# Patient Record
Sex: FEMALE | Race: BLACK OR AFRICAN AMERICAN | NOT HISPANIC OR LATINO | Employment: OTHER | URBAN - METROPOLITAN AREA
[De-identification: names, ages, dates, MRNs, and addresses within clinical notes are randomized per-mention and may not be internally consistent; named-entity substitution may affect disease eponyms.]

---

## 2017-01-16 ENCOUNTER — APPOINTMENT (OUTPATIENT)
Dept: PREADMISSION TESTING | Facility: HOSPITAL | Age: 67
End: 2017-01-16

## 2017-01-16 VITALS
HEIGHT: 66 IN | TEMPERATURE: 97 F | BODY MASS INDEX: 27.97 KG/M2 | OXYGEN SATURATION: 95 % | WEIGHT: 174 LBS | RESPIRATION RATE: 16 BRPM | DIASTOLIC BLOOD PRESSURE: 71 MMHG | SYSTOLIC BLOOD PRESSURE: 113 MMHG | HEART RATE: 84 BPM

## 2017-01-16 DIAGNOSIS — E21.3 HYPERPARATHYROIDISM (HCC): ICD-10-CM

## 2017-01-16 LAB
ANION GAP SERPL CALCULATED.3IONS-SCNC: 13.3 MMOL/L
BUN BLD-MCNC: 18 MG/DL (ref 8–23)
BUN/CREAT SERPL: 16.7 (ref 7–25)
CALCIUM SPEC-SCNC: 11 MG/DL (ref 8.6–10.5)
CALCIUM SPEC-SCNC: 11.1 MG/DL (ref 8.6–10.5)
CHLORIDE SERPL-SCNC: 104 MMOL/L (ref 98–107)
CO2 SERPL-SCNC: 26.7 MMOL/L (ref 22–29)
CREAT BLD-MCNC: 1.08 MG/DL (ref 0.57–1)
DEPRECATED RDW RBC AUTO: 46.5 FL (ref 37–54)
ERYTHROCYTE [DISTWIDTH] IN BLOOD BY AUTOMATED COUNT: 14.2 % (ref 11.7–13)
GFR SERPL CREATININE-BSD FRML MDRD: 51 ML/MIN/1.73
GLUCOSE BLD-MCNC: 94 MG/DL (ref 65–99)
HCT VFR BLD AUTO: 43.2 % (ref 35.6–45.5)
HGB BLD-MCNC: 13.3 G/DL (ref 11.9–15.5)
MCH RBC QN AUTO: 27.5 PG (ref 26.9–32)
MCHC RBC AUTO-ENTMCNC: 30.8 G/DL (ref 32.4–36.3)
MCV RBC AUTO: 89.3 FL (ref 80.5–98.2)
PLATELET # BLD AUTO: 414 10*3/MM3 (ref 140–500)
PMV BLD AUTO: 8.9 FL (ref 6–12)
POTASSIUM BLD-SCNC: 4.4 MMOL/L (ref 3.5–5.2)
PTH-INTACT SERPL-MCNC: 85.1 PG/ML (ref 15–65)
RBC # BLD AUTO: 4.84 10*6/MM3 (ref 3.9–5.2)
SODIUM BLD-SCNC: 144 MMOL/L (ref 136–145)
WBC NRBC COR # BLD: 6.95 10*3/MM3 (ref 4.5–10.7)

## 2017-01-16 PROCEDURE — 80048 BASIC METABOLIC PNL TOTAL CA: CPT | Performed by: NURSE PRACTITIONER

## 2017-01-16 PROCEDURE — 84100 ASSAY OF PHOSPHORUS: CPT | Performed by: NURSE PRACTITIONER

## 2017-01-16 PROCEDURE — 93005 ELECTROCARDIOGRAM TRACING: CPT

## 2017-01-16 PROCEDURE — 36415 COLL VENOUS BLD VENIPUNCTURE: CPT

## 2017-01-16 PROCEDURE — 85027 COMPLETE CBC AUTOMATED: CPT | Performed by: SURGERY

## 2017-01-16 PROCEDURE — 83970 ASSAY OF PARATHORMONE: CPT | Performed by: NURSE PRACTITIONER

## 2017-01-16 PROCEDURE — 93010 ELECTROCARDIOGRAM REPORT: CPT | Performed by: INTERNAL MEDICINE

## 2017-01-16 RX ORDER — ERGOCALCIFEROL 1.25 MG/1
50000 CAPSULE ORAL WEEKLY
COMMUNITY
End: 2017-02-10 | Stop reason: SDUPTHER

## 2017-01-16 NOTE — MR AVS SNAPSHOT
Lucretianguyễn Robbin   1/16/2017 12:00 PM   Appointment    Provider:  CAMERON CALABRESE 3   Department:  [facility] PREADMISSION T   Dept Phone:  [phone]                Your Full Care Plan           To Do List     2/3/2017 8:10 AM     Appointment with HILLARY COATES at [facility] ENDOCRINOLOGY ([phone])   [address]  [facility] [address]       2/10/2017 10:00 AM     Appointment with Gera Kitchen MD at [facility] ENDOCRINOLOGY ([phone])   Arrive 15 minutes prior to appointment.   [address]  [facility] [address]            Your Updated Medication List          This list is accurate as of: 1/16/17 12:37 PM.  Always use your most recent med list.                ACCU-CHEK SONIA PLUS W/DEVICE kit       ADVAIR DISKUS 500-50 MCG/DOSE DISKUS   Generic drug:  fluticasone-salmeterol       AMBIEN PO       amLODIPine 5 MG tablet   Commonly known as:  NORVASC       AUGMENTIN 500-125 MG per tablet   Generic drug:  amoxicillin-clavulanate       BENICAR 40 MG tablet   Generic drug:  olmesartan       cetirizine 10 MG tablet   Commonly known as:  zyrTEC       fexofenadine 180 MG tablet   Commonly known as:  ALLEGRA       fluticasone 50 MCG/ACT nasal spray   Commonly known as:  FLONASE       glipiZIDE 5 MG tablet   Commonly known as:  GLUCOTROL       INVOKANA 300 MG tablet   Generic drug:  Canagliflozin       metFORMIN 1000 MG tablet   Commonly known as:  GLUCOPHAGE       MUCINEX MAXIMUM STRENGTH 1200 MG tablet sustained-release 12 hour   Generic drug:  GuaiFENesin ER       pravastatin 80 MG tablet   Commonly known as:  PRAVACHOL       VENTOLIN  (90 BASE) MCG/ACT inhaler   Generic drug:  albuterol       vitamin D 12242 UNITS capsule capsule   Commonly known as:  ERGOCALCIFEROL               MyChart Signup     [facility] MyChart allows you to send messages to your doctor, view your test results,  "renew your prescriptions, schedule appointments, and more. To sign up, go to [email or web address] and click on the Sign Up Now link in the New User? box. Enter your [employer] Activation Code exactly as it appears below along with the last four digits of your Social Security Number and your Date of Birth () to complete the sign-up process. If you do not sign up before the expiration date, you must request a new code.    [employer] Activation Code: [email or web address]  Expires: 2017 12:37 PM    If you have questions, you can email [email or web address] or call [phone] to talk to our [employer] staff. Remember, Dem[employer] is NOT to be used for urgent needs. For medical emergencies, dial 911.               Other Info from Your Visit           Allergies     No Known Allergies      Vital Signs     Blood Pressure Pulse Temperature Respirations Height Weight    113/71 (BP Location: Right arm, Patient Position: Sitting) 84 97 °F (36.1 °C) (Oral) 16 66\" (167.6 cm) 174 lb (78.9 kg)    Oxygen Saturation Body Mass Index Smoking Status             95% 28.08 kg/m2 Former Smoker           Discharge Instructions       Take the following medications the morning of surgery with a small sip of water.  AMLODIPINE, BENICAR, ADVAIR    Arrive to hospital on your day of surgery at 8:00 AM    General Instructions:  • Do not eat or drink after midnight: includes water, mints, or gum. You may brush your teeth.  • Do not smoke, chew tobacco, or drink alcohol.  • Bring medications in original bottles, any inhalers and if applicable your C-PAP/ BI-PAP machine.  • Bring any papers given to you in the doctor’s office.  • Wear clean comfortable clothes and socks.  • Do not wear contact lenses or make-up.  Bring a case for your glasses if applicable.   • Bring crutches or walker if applicable.  • Leave all other valuables and jewelry at home.    If you were given a blood bank ID arm band remember to bring it with you the day of " surgery.    Preventing a Surgical Site Infection:  Shower on the morning of surgery using a fresh bar of anti-bacterial soap (such as Dial) and clean washcloth.  Dry with a clean towel and dress in clean clothing.  For 2 to 3 days before surgery, avoid shaving with a razor near where you will have surgery because the razor can irritate skin and make it easier to develop an infection  Ask your surgeon if you will be receiving antibiotics prior to surgery  Make sure you, your family, and all healthcare providers clean their hands with soap and water or an alcohol based hand  before caring for you or your wound  If at all possible, quit smoking as many days before surgery as you can.    Day of surgery:  Upon arrival, a Pre-op nurse and Anesthesiologist will review your health history, obtain vital signs, and answer questions you may have.  The only belongings needed at this time will be your home medications and if applicable your C-PAP/BI-PAP machine.  If you are staying overnight your family can leave the rest of your belongings in the car and bring them to your room later.  A Pre-op nurse will start an IV and you may receive medication in preparation for surgery, including something to help you relax.  Your family will be able to see you in the Pre-op area.  While you are in surgery your family should notify the waiting room  if they leave the waiting room area and provide a contact phone number.    Please be aware that surgery does come with discomfort.  We want to make every effort to control your discomfort so please discuss any uncontrolled symptoms with your nurse.   Your doctor will most likely have prescribed pain medications.      If you are going home after surgery you will receive individualized written care instructions before being discharged.  A responsible adult must drive you to and from the hospital on the day of your surgery and stay with you for 24 hours.    If you are staying  overnight following surgery, you will be transported to your hospital room following the recovery period.  [facility] has all private rooms.    If you have any questions please call Pre-Admission Testing at [phone].  Deductibles and co-payments are collected on the day of service. Please be prepared to pay the required co-pay, deductible or deposit on the day of service as defined by your plan.       SYMPTOMS OF A STROKE    Call 911 or have someone take you to the Emergency Department if you have any of the following:    · Sudden numbness or weakness of your face, arm or leg especially on one side of the body  · Sudden confusion, diffiiculty speaking or trouble understanding   · Changes in your vision or loss of sight in one eye  · Sudden severe headache with no known cause  · sudden dizziness, trouble walking, loss of balance or coordination    It is important to seek emergency care right away if you have further stroke symptoms. If you get emergency help quickly, the powerful clot-dissolving medicines can reduce the disabilities caused by a stroke.     For more information:    American Stroke Association  2-978-3-STROKE  www.strokeassociation.org           IF YOU SMOKE OR USE TOBACCO PLEASE READ THE FOLLOWING:    Why is smoking bad for me?  Smoking increases the risk of heart disease, lung disease, vascular disease, stroke, and cancer.     If you smoke, STOP!    If you would like more information on quitting smoking, please visit the [employer] website: [email or web address]   This link will provide additional resources including the QUIT line and the Beat the Pack support groups.     For more information:    American Cancer Society  ([phone] 819-9294    American Heart Association  1-979.383.8639

## 2017-01-16 NOTE — DISCHARGE INSTRUCTIONS
Take the following medications the morning of surgery with a small sip of water.  AMLODIPINE, BENICAR, ADVAIR    Arrive to hospital on your day of surgery at 8:00 AM    General Instructions:  • Do not eat or drink after midnight: includes water, mints, or gum. You may brush your teeth.  • Do not smoke, chew tobacco, or drink alcohol.  • Bring medications in original bottles, any inhalers and if applicable your C-PAP/ BI-PAP machine.  • Bring any papers given to you in the doctor’s office.  • Wear clean comfortable clothes and socks.  • Do not wear contact lenses or make-up.  Bring a case for your glasses if applicable.   • Bring crutches or walker if applicable.  • Leave all other valuables and jewelry at home.    If you were given a blood bank ID arm band remember to bring it with you the day of surgery.    Preventing a Surgical Site Infection:  Shower on the morning of surgery using a fresh bar of anti-bacterial soap (such as Dial) and clean washcloth.  Dry with a clean towel and dress in clean clothing.  For 2 to 3 days before surgery, avoid shaving with a razor near where you will have surgery because the razor can irritate skin and make it easier to develop an infection  Ask your surgeon if you will be receiving antibiotics prior to surgery  Make sure you, your family, and all healthcare providers clean their hands with soap and water or an alcohol based hand  before caring for you or your wound  If at all possible, quit smoking as many days before surgery as you can.    Day of surgery:  Upon arrival, a Pre-op nurse and Anesthesiologist will review your health history, obtain vital signs, and answer questions you may have.  The only belongings needed at this time will be your home medications and if applicable your C-PAP/BI-PAP machine.  If you are staying overnight your family can leave the rest of your belongings in the car and bring them to your room later.  A Pre-op nurse will start an IV and you  may receive medication in preparation for surgery, including something to help you relax.  Your family will be able to see you in the Pre-op area.  While you are in surgery your family should notify the waiting room  if they leave the waiting room area and provide a contact phone number.    Please be aware that surgery does come with discomfort.  We want to make every effort to control your discomfort so please discuss any uncontrolled symptoms with your nurse.   Your doctor will most likely have prescribed pain medications.      If you are going home after surgery you will receive individualized written care instructions before being discharged.  A responsible adult must drive you to and from the hospital on the day of your surgery and stay with you for 24 hours.    If you are staying overnight following surgery, you will be transported to your hospital room following the recovery period.  [facility] has all private rooms.    If you have any questions please call Pre-Admission Testing at [phone].  Deductibles and co-payments are collected on the day of service. Please be prepared to pay the required co-pay, deductible or deposit on the day of service as defined by your plan.

## 2017-01-19 ENCOUNTER — ANESTHESIA EVENT (OUTPATIENT)
Dept: PERIOP | Facility: HOSPITAL | Age: 67
End: 2017-01-19

## 2017-01-19 ENCOUNTER — ANESTHESIA (OUTPATIENT)
Dept: PERIOP | Facility: HOSPITAL | Age: 67
End: 2017-01-19

## 2017-01-19 PROCEDURE — 25010000003 CEFAZOLIN IN DEXTROSE 2-4 GM/100ML-% SOLUTION: Performed by: NURSE PRACTITIONER

## 2017-01-19 PROCEDURE — 25010000002 PROPOFOL 10 MG/ML EMULSION: Performed by: NURSE ANESTHETIST, CERTIFIED REGISTERED

## 2017-01-19 PROCEDURE — 25010000002 NEOSTIGMINE 10 MG/10ML SOLUTION: Performed by: NURSE ANESTHETIST, CERTIFIED REGISTERED

## 2017-01-19 PROCEDURE — 25010000002 ONDANSETRON PER 1 MG: Performed by: NURSE ANESTHETIST, CERTIFIED REGISTERED

## 2017-01-19 PROCEDURE — 25010000002 FENTANYL CITRATE (PF) 100 MCG/2ML SOLUTION: Performed by: ANESTHESIOLOGY

## 2017-01-19 RX ORDER — ONDANSETRON 2 MG/ML
INJECTION INTRAMUSCULAR; INTRAVENOUS AS NEEDED
Status: DISCONTINUED | OUTPATIENT
Start: 2017-01-19 | End: 2017-01-19 | Stop reason: SURG

## 2017-01-19 RX ORDER — PROPOFOL 10 MG/ML
VIAL (ML) INTRAVENOUS AS NEEDED
Status: DISCONTINUED | OUTPATIENT
Start: 2017-01-19 | End: 2017-01-19 | Stop reason: SURG

## 2017-01-19 RX ORDER — GLYCOPYRROLATE 0.2 MG/ML
INJECTION INTRAMUSCULAR; INTRAVENOUS AS NEEDED
Status: DISCONTINUED | OUTPATIENT
Start: 2017-01-19 | End: 2017-01-19 | Stop reason: SURG

## 2017-01-19 RX ORDER — LIDOCAINE HYDROCHLORIDE 20 MG/ML
INJECTION, SOLUTION INFILTRATION; PERINEURAL AS NEEDED
Status: DISCONTINUED | OUTPATIENT
Start: 2017-01-19 | End: 2017-01-19 | Stop reason: SURG

## 2017-01-19 RX ORDER — ROCURONIUM BROMIDE 10 MG/ML
INJECTION, SOLUTION INTRAVENOUS AS NEEDED
Status: DISCONTINUED | OUTPATIENT
Start: 2017-01-19 | End: 2017-01-19 | Stop reason: SURG

## 2017-01-19 RX ORDER — NEOSTIGMINE METHYLSULFATE 1 MG/ML
INJECTION, SOLUTION INTRAVENOUS AS NEEDED
Status: DISCONTINUED | OUTPATIENT
Start: 2017-01-19 | End: 2017-01-19 | Stop reason: SURG

## 2017-01-19 RX ORDER — LABETALOL HYDROCHLORIDE 5 MG/ML
INJECTION, SOLUTION INTRAVENOUS AS NEEDED
Status: DISCONTINUED | OUTPATIENT
Start: 2017-01-19 | End: 2017-01-19 | Stop reason: SURG

## 2017-01-19 RX ADMIN — PROPOFOL 160 MG: 10 INJECTION, EMULSION INTRAVENOUS at 10:10

## 2017-01-19 RX ADMIN — CEFAZOLIN SODIUM 2 G: 2 INJECTION, SOLUTION INTRAVENOUS at 10:08

## 2017-01-19 RX ADMIN — LABETALOL HYDROCHLORIDE 5 MG: 5 INJECTION, SOLUTION INTRAVENOUS at 10:17

## 2017-01-19 RX ADMIN — LABETALOL HYDROCHLORIDE 5 MG: 5 INJECTION, SOLUTION INTRAVENOUS at 10:23

## 2017-01-19 RX ADMIN — FENTANYL CITRATE 100 MCG: 50 INJECTION INTRAMUSCULAR; INTRAVENOUS at 10:08

## 2017-01-19 RX ADMIN — NEOSTIGMINE METHYLSULFATE 3 MG: 1 INJECTION INTRAVENOUS at 11:11

## 2017-01-19 RX ADMIN — GLYCOPYRROLATE 0.6 MG: 0.2 INJECTION INTRAMUSCULAR; INTRAVENOUS at 11:11

## 2017-01-19 RX ADMIN — LIDOCAINE HYDROCHLORIDE 60 MG: 20 INJECTION, SOLUTION INFILTRATION; PERINEURAL at 10:10

## 2017-01-19 RX ADMIN — ONDANSETRON 4 MG: 2 INJECTION INTRAMUSCULAR; INTRAVENOUS at 11:02

## 2017-01-19 RX ADMIN — ROCURONIUM BROMIDE 35 MG: 10 INJECTION INTRAVENOUS at 10:10

## 2017-01-19 NOTE — ANESTHESIA PREPROCEDURE EVALUATION
Anesthesia Evaluation     Patient summary reviewed and Nursing notes reviewed    History of anesthetic complications   Airway   Mallampati: II  TM distance: >3 FB  Neck ROM: full  no difficulty expected  Dental - normal exam     Pulmonary - normal exam   (+) asthma,   Cardiovascular - normal exam  (+) hypertension, valvular problems/murmurs,     ROS comment: No sx    Neuro/Psych- negative ROS  GI/Hepatic/Renal/Endo    (+)  diabetes mellitus,     Musculoskeletal (-) negative ROS    Abdominal  - normal exam   Substance History - negative use     OB/GYN negative ob/gyn ROS         Other                             Anesthesia Plan    ASA 3     general   (Ponv)  intravenous induction   Anesthetic plan and risks discussed with patient.    Plan discussed with CRNA.

## 2017-01-19 NOTE — ANESTHESIA PROCEDURE NOTES
Airway  Urgency: elective    Date/Time: 1/19/2017 10:12 AM  Airway not difficult    General Information and Staff    Patient location during procedure: OR  Anesthesiologist: RASHAD GUZMÁN  CRNA: JOCELYN BURNETT    Indications and Patient Condition  Indications for airway management: airway protection    Preoxygenated: yes  Mask difficulty assessment: 1 - vent by mask    Final Airway Details  Final airway type: endotracheal airway      Successful airway: ETT  Cuffed: yes   Successful intubation technique: direct laryngoscopy  Endotracheal tube insertion site: oral  Blade: Jodi  Blade size: #3  ETT size: 7.0 mm  Cormack-Lehane Classification: grade III - view of epiglottis only  Placement verified by: chest auscultation and capnometry   Cuff volume (mL): 4  Measured from: lips  ETT to lips (cm): 20  Number of attempts at approach: 1    Additional Comments  Smooth IV induction. Trachea intubated. Cuff up. Ett secured. BEBS. Dentition intact without injury.

## 2017-01-19 NOTE — ANESTHESIA POSTPROCEDURE EVALUATION
Patient: Roger Siegel    Procedure Summary     Date Anesthesia Start Anesthesia Stop Room / Location    01/19/17 1004 1125  [facility]  /  [facility]       Procedure Diagnosis Surgeon Provider    RIGHT SUPERIOR PARATHYROIDECTOMY (Right Neck) Parathyroid adenoma  (Parathyroid adenoma [D35.1]) MD Reji Alvarado MD          Anesthesia Type: general  Last vitals  /70 (01/19/17 1223)    Temp 36.7 °C (98 °F) (01/19/17 1215)    Pulse 64 (01/19/17 1223)   Resp 16 (01/19/17 1223)    SpO2 93 % (01/19/17 1223)      Post Anesthesia Care and Evaluation    Patient location during evaluation: bedside  Patient participation: complete - patient participated  Level of consciousness: awake  Pain score: 1  Pain management: adequate  Airway patency: patent  Anesthetic complications: No anesthetic complications    Cardiovascular status: acceptable  Respiratory status: acceptable  Hydration status: acceptable

## 2017-01-23 ENCOUNTER — TELEPHONE (OUTPATIENT)
Dept: SURGERY | Facility: CLINIC | Age: 67
End: 2017-01-23

## 2017-01-23 ENCOUNTER — LAB (OUTPATIENT)
Dept: LAB | Facility: HOSPITAL | Age: 67
End: 2017-01-23

## 2017-01-23 DIAGNOSIS — E83.52 HYPERCALCEMIA: ICD-10-CM

## 2017-01-23 DIAGNOSIS — D35.1 PARATHYROID ADENOMA: ICD-10-CM

## 2017-01-23 LAB — CALCIUM SPEC-SCNC: 10.5 MG/DL (ref 8.6–10.5)

## 2017-01-23 PROCEDURE — 36415 COLL VENOUS BLD VENIPUNCTURE: CPT

## 2017-01-23 PROCEDURE — 82310 ASSAY OF CALCIUM: CPT

## 2017-01-23 NOTE — TELEPHONE ENCOUNTER
Telephone note    Phoned Mrs. Siegel with her calcium result of 10.5, and noted that she needed to change her calcium supplementation from 4 tablets daily, to 2 tablets daily now.  Next week she should stop the calcium.    She'll follow-up with Dr. Kitchen next week and he can check her calcium level at that time if desired and make recommendations regarding her vitamin D supplementation which she began.    Maria C Silverio MD

## 2017-01-27 ENCOUNTER — OFFICE VISIT (OUTPATIENT)
Dept: SURGERY | Facility: CLINIC | Age: 67
End: 2017-01-27

## 2017-01-27 DIAGNOSIS — Z98.890 POST-OPERATIVE STATE: Primary | ICD-10-CM

## 2017-01-27 PROCEDURE — 99024 POSTOP FOLLOW-UP VISIT: CPT | Performed by: SURGERY

## 2017-01-27 RX ORDER — ZOLPIDEM TARTRATE 10 MG/1
10 TABLET ORAL NIGHTLY PRN
COMMUNITY
Start: 2017-01-09 | End: 2018-02-14 | Stop reason: SDDI

## 2017-01-27 NOTE — PROGRESS NOTES
CC: Post-op Visit Jean Claude    66 y.o. female who returns to the office for a postoperative visit after right superior parathyroid resection on 1/19/17    Preoperative indications were hypercalcium    Intraoperative findings were an intrathyroidal parathyroid adenoma    Pathology showed a 547 mg parathyroid, 1.1 cm hypercellular    Today, she looks great.  She has some swelling as expected, that will resolve.  Her calcium was 10.5 earlier on 4 tabs.  i suggested she cut back to 2.  She will stop next week and have labs later in the week at Dr Kitchen.  He will make recommendations on vit D.    i'll see her prn.    Maria C Silverio MD

## 2017-01-27 NOTE — MR AVS SNAPSHOT
Roger Siegel   1/27/2017 2:30 PM   Office Visit    Dept Phone:  [phone]   Encounter #:  [id number]    Provider:  Maria C Silverio MD   Department:  [facility] GENERAL SURGERY                Your Full Care Plan              Today's Medication Changes          These changes are accurate as of: 1/27/17  3:03 PM.  If you have any questions, ask your nurse or doctor.               Medication(s)that have changed:     zolpidem 10 MG tablet   Commonly known as:  AMBIEN   Take 10 mg by mouth At Night As Needed.   What changed:  Another medication with the same name was removed. Continue taking this medication, and follow the directions you see here.         Stop taking medication(s)listed here:     oxyCODONE-acetaminophen 5-325 MG per tablet   Commonly known as:  ROXICET           promethazine 25 MG tablet   Commonly known as:  PHENERGAN                      Your Updated Medication List          This list is accurate as of: 1/27/17  3:03 PM.  Always use your most recent med list.                ACCU-CHEK SONIA PLUS W/DEVICE kit       ADVAIR DISKUS 500-50 MCG/DOSE DISKUS   Generic drug:  fluticasone-salmeterol       amLODIPine 5 MG tablet   Commonly known as:  NORVASC       BENICAR 40 MG tablet   Generic drug:  olmesartan       calcium-vitamin D 500-200 MG-UNIT per tablet   Commonly known as:  OSCAL 500/200 D-3   Take 2 tablets by mouth 2 (Two) Times a Day for 30 days.       cetirizine 10 MG tablet   Commonly known as:  zyrTEC       fexofenadine 180 MG tablet   Commonly known as:  ALLEGRA       fluticasone 50 MCG/ACT nasal spray   Commonly known as:  FLONASE       glipiZIDE 5 MG tablet   Commonly known as:  GLUCOTROL       INVOKANA 300 MG tablet   Generic drug:  Canagliflozin       metFORMIN 1000 MG tablet   Commonly known as:  GLUCOPHAGE       MUCINEX MAXIMUM STRENGTH 1200 MG tablet sustained-release 12 hour   Generic drug:  GuaiFENesin ER       pravastatin 80 MG tablet      Commonly known as:  PRAVACHOL       VENTOLIN  (90 BASE) MCG/ACT inhaler   Generic drug:  albuterol       vitamin D 39812 UNITS capsule capsule   Commonly known as:  ERGOCALCIFEROL       zolpidem 10 MG tablet   Commonly known as:  AMBIEN               You Were Diagnosed With        Codes Comments    Post-operative state    -  Primary ICD-10-CM: Z98.890  ICD-9-CM: V45.89       Instructions     None    Patient Instructions History      Upcoming Appointments     Visit Type Date Time Department    POST-OP 2017  2:30 PM [facility]    [facility] 2/3/2017  8:10 AM [facility]    OFFICE VISIT 2/10/2017 10:00 AM [facility]      [employer] Signup     [employer] allows you to send messages to your doctor, view your test results, renew your prescriptions, schedule appointments, and more. To sign up, go to [employer] and click on the Sign Up Now link in the New User? box. Enter your [employer] Activation Code exactly as it appears below along with the last four digits of your Social Security Number and your Date of Birth () to complete the sign-up process. If you do not sign up before the expiration date, you must request a new code.    [employer] Activation Code: [email or web address]  Expires: 2017 12:37 PM    If you have questions, you can email [email or web address] or call [phone] to talk to our [employer] staff. Remember, [employer] is NOT to be used for urgent needs. For medical emergencies, dial 911.               Other Info from Your Visit           Your Appointments     2017  8:10 AM EST   [facility] with [facility], [facility]   [facility] ENDOCRINOLOGY (--)    [address] [place] [id number] [phone]            Feb 10, 2017 10:00 AM EST   Office Visit with Gera Kitchen MD   [facility] ENDOCRINOLOGY (--)    [address] [place] [id number] [phone]            Arrive 15 minutes prior to appointment.              Allergies     No Known Allergies      Reason for Visit     Post-op ·Parathyroid adenoma resection, right superior on 01/19/2017      Vital Signs     Smoking Status                   Former Smoker           Problems and Diagnoses Noted     History of parathyroid surgery    Postoperative state    -  Primary      No Longer an Issue     Serum calcium elevated

## 2017-01-31 DIAGNOSIS — E11.9 CONTROLLED TYPE 2 DIABETES MELLITUS WITHOUT COMPLICATION, UNSPECIFIED LONG TERM INSULIN USE STATUS: ICD-10-CM

## 2017-01-31 DIAGNOSIS — E04.2 NONTOXIC MULTINODULAR GOITER: ICD-10-CM

## 2017-01-31 DIAGNOSIS — E78.49 OTHER HYPERLIPIDEMIA: Primary | ICD-10-CM

## 2017-01-31 DIAGNOSIS — E55.9 VITAMIN D DEFICIENCY: ICD-10-CM

## 2017-02-03 ENCOUNTER — LAB (OUTPATIENT)
Dept: ENDOCRINOLOGY | Age: 67
End: 2017-02-03

## 2017-02-03 DIAGNOSIS — E78.49 OTHER HYPERLIPIDEMIA: ICD-10-CM

## 2017-02-03 DIAGNOSIS — E11.9 CONTROLLED TYPE 2 DIABETES MELLITUS WITHOUT COMPLICATION, UNSPECIFIED LONG TERM INSULIN USE STATUS: ICD-10-CM

## 2017-02-03 DIAGNOSIS — E55.9 VITAMIN D DEFICIENCY: ICD-10-CM

## 2017-02-03 DIAGNOSIS — E04.2 NONTOXIC MULTINODULAR GOITER: ICD-10-CM

## 2017-02-04 LAB — 25(OH)D3+25(OH)D2 SERPL-MCNC: 49.6 NG/ML (ref 30–100)

## 2017-02-07 LAB
ALBUMIN SERPL-MCNC: 4.5 G/DL (ref 3.5–5.2)
ALBUMIN/GLOB SERPL: 1.4 G/DL
ALP SERPL-CCNC: 67 U/L (ref 39–117)
ALT SERPL-CCNC: 7 U/L (ref 1–33)
AST SERPL-CCNC: 11 U/L (ref 1–32)
BILIRUB SERPL-MCNC: 0.4 MG/DL (ref 0.1–1.2)
BUN SERPL-MCNC: 20 MG/DL (ref 8–23)
BUN/CREAT SERPL: 21.1 (ref 7–25)
C PEPTIDE SERPL-MCNC: 2.6 NG/ML (ref 1.1–4.4)
CALCIUM SERPL-MCNC: 10.5 MG/DL (ref 8.6–10.5)
CHLORIDE SERPL-SCNC: 99 MMOL/L (ref 98–107)
CHOLEST SERPL-MCNC: 197 MG/DL (ref 0–200)
CO2 SERPL-SCNC: 25 MMOL/L (ref 22–29)
CREAT SERPL-MCNC: 0.95 MG/DL (ref 0.57–1)
GLOBULIN SER CALC-MCNC: 3.3 GM/DL
GLUCOSE SERPL-MCNC: 107 MG/DL (ref 65–99)
HBA1C MFR BLD: 6.4 % (ref 4.8–5.6)
HDLC SERPL-MCNC: 36 MG/DL (ref 40–60)
LDLC SERPL CALC-MCNC: 133 MG/DL (ref 0–100)
MICROALBUMIN UR-MCNC: 6.6 UG/ML
POTASSIUM SERPL-SCNC: 4.3 MMOL/L (ref 3.5–5.2)
PROT SERPL-MCNC: 7.8 G/DL (ref 6–8.5)
SODIUM SERPL-SCNC: 139 MMOL/L (ref 136–145)
T3FREE SERPL-MCNC: 3 PG/ML (ref 2–4.4)
T4 FREE SERPL-MCNC: 1.21 NG/DL (ref 0.93–1.7)
T4 SERPL-MCNC: 6.67 MCG/DL (ref 4.5–11.7)
THYROGLOB AB SERPL-ACNC: <1 IU/ML
THYROGLOB SERPL-MCNC: 6.7 NG/ML
THYROGLOB SERPL-MCNC: NORMAL NG/ML
TRIGL SERPL-MCNC: 139 MG/DL (ref 0–150)
TSH SERPL DL<=0.005 MIU/L-ACNC: 2.1 MIU/ML (ref 0.27–4.2)
VLDLC SERPL CALC-MCNC: 27.8 MG/DL (ref 5–40)

## 2017-02-10 ENCOUNTER — OFFICE VISIT (OUTPATIENT)
Dept: ENDOCRINOLOGY | Age: 67
End: 2017-02-10

## 2017-02-10 VITALS
SYSTOLIC BLOOD PRESSURE: 116 MMHG | HEIGHT: 66 IN | RESPIRATION RATE: 16 BRPM | WEIGHT: 171 LBS | DIASTOLIC BLOOD PRESSURE: 80 MMHG | BODY MASS INDEX: 27.48 KG/M2

## 2017-02-10 DIAGNOSIS — Z98.890 HISTORY OF PARATHYROID SURGERY: ICD-10-CM

## 2017-02-10 DIAGNOSIS — E04.2 NONTOXIC MULTINODULAR GOITER: ICD-10-CM

## 2017-02-10 DIAGNOSIS — E83.52 HYPERCALCEMIA: ICD-10-CM

## 2017-02-10 DIAGNOSIS — M81.0 POSTMENOPAUSAL OSTEOPOROSIS: ICD-10-CM

## 2017-02-10 DIAGNOSIS — E11.9 CONTROLLED TYPE 2 DIABETES MELLITUS WITHOUT COMPLICATION, UNSPECIFIED LONG TERM INSULIN USE STATUS: Primary | ICD-10-CM

## 2017-02-10 DIAGNOSIS — E78.49 OTHER HYPERLIPIDEMIA: ICD-10-CM

## 2017-02-10 DIAGNOSIS — I10 BENIGN ESSENTIAL HTN: ICD-10-CM

## 2017-02-10 PROCEDURE — 99214 OFFICE O/P EST MOD 30 MIN: CPT | Performed by: INTERNAL MEDICINE

## 2017-02-10 RX ORDER — LINAGLIPTIN AND METFORMIN HYDROCHLORIDE 2.5; 1 MG/1; MG/1
2 TABLET, FILM COATED, EXTENDED RELEASE ORAL DAILY
Qty: 60 TABLET | Refills: 5 | Status: SHIPPED | OUTPATIENT
Start: 2017-02-10 | End: 2017-07-07 | Stop reason: SDUPTHER

## 2017-02-10 RX ORDER — ERGOCALCIFEROL 1.25 MG/1
50000 CAPSULE ORAL WEEKLY
Qty: 13 CAPSULE | Refills: 3 | Status: SHIPPED | OUTPATIENT
Start: 2017-02-10 | End: 2018-02-14 | Stop reason: SDUPTHER

## 2017-02-10 RX ORDER — QUETIAPINE FUMARATE 50 MG/1
50 TABLET, FILM COATED ORAL NIGHTLY
Qty: 30 TABLET | Refills: 11 | Status: SHIPPED | OUTPATIENT
Start: 2017-02-10 | End: 2018-02-14 | Stop reason: SDUPTHER

## 2017-02-10 RX ORDER — CANAGLIFLOZIN 300 MG/1
300 TABLET, FILM COATED ORAL DAILY
Qty: 30 TABLET | Refills: 5 | Status: SHIPPED | OUTPATIENT
Start: 2017-02-10 | End: 2018-02-14 | Stop reason: SDUPTHER

## 2017-02-10 NOTE — PROGRESS NOTES
"Subjective   Roger Siegel is a 66 y.o. female seen for follow up for goiter, hyperparathyroidism, lab review. Patient had her right parathyroid removed 01/19/2017 with Dr. Silverio. She has questions about her diabetes medications that her PCP prescribed.   History of Present Illness this is a 66-year-old female known patient with type II diabetes since status post parathyroidectomy for hyperparathyroidism and multinodular goiter as well as hypertension and dyslipidemia with vitamin D deficiency.  Over the course of last 6 months she has had no significant problems to go to emergency room.  She did undergo parathyroidectomy on 01/19/2017 which went uneventful and without problem.  She has trouble going to sleep and Ambien and helps very little.    No Known Allergies    Visit Vitals   • /80   • Resp 16   • Ht 66\" (167.6 cm)   • Wt 171 lb (77.6 kg)   • BMI 27.6 kg/m2       Current Outpatient Prescriptions:   •  ADVAIR DISKUS 500-50 MCG/DOSE DISKUS, Inhale 1 puff 2 (Two) Times a Day., Disp: , Rfl:   •  amLODIPine (NORVASC) 5 MG tablet, Take 5 mg by mouth Daily., Disp: , Rfl:   •  BENICAR 40 MG tablet, Take 40 mg by mouth Daily., Disp: , Rfl:   •  Blood Glucose Monitoring Suppl (ACCU-CHEK SONIA PLUS) W/DEVICE kit, , Disp: , Rfl:   •  Canagliflozin (INVOKANA) 300 MG tablet, Take 300 mg by mouth daily., Disp: , Rfl:   •  cetirizine (zyrTEC) 10 MG tablet, Take 10 mg by mouth Daily As Needed., Disp: , Rfl:   •  fexofenadine (ALLEGRA) 180 MG tablet, Take 180 mg by mouth Daily As Needed., Disp: , Rfl:   •  fluticasone (FLONASE) 50 MCG/ACT nasal spray, 2 sprays into each nostril Daily., Disp: , Rfl:   •  glipiZIDE (GLUCOTROL) 5 MG tablet, Take 5 mg by mouth Daily., Disp: , Rfl:   •  GuaiFENesin ER (MUCINEX MAXIMUM STRENGTH) 1200 MG tablet sustained-release 12 hour, Take 1 tablet by mouth As Needed., Disp: , Rfl:   •  metFORMIN (GLUCOPHAGE) 1000 MG tablet, Take 1,000 mg by mouth 2 (Two) Times a Day With Meals., Disp: , " Rfl:   •  pravastatin (PRAVACHOL) 80 MG tablet, Take 80 mg by mouth Every Night., Disp: , Rfl:   •  VENTOLIN  (90 BASE) MCG/ACT inhaler, Inhale 2 puffs Every 6 (Six) Hours As Needed., Disp: , Rfl:   •  vitamin D (ERGOCALCIFEROL) 36432 UNITS capsule capsule, Take 50,000 Units by mouth 1 (One) Time Per Week. SUNDAYS, Disp: , Rfl:   •  zolpidem (AMBIEN) 10 MG tablet, Take 10 mg by mouth At Night As Needed., Disp: , Rfl:       The following portions of the patient's history were reviewed and updated as appropriate: allergies, current medications, past family history, past medical history, past social history, past surgical history and problem list.    Review of Systems   Constitutional: Negative.    HENT: Negative.    Eyes: Negative.    Respiratory: Negative.    Cardiovascular: Negative.    Gastrointestinal: Negative.    Endocrine: Negative.    Genitourinary: Negative.    Musculoskeletal: Negative.    Skin: Negative.    Allergic/Immunologic: Negative.    Neurological: Negative.    Hematological: Negative.    Psychiatric/Behavioral: Negative.        Objective    Results for orders placed or performed in visit on 02/03/17   Comprehensive Metabolic Panel   Result Value Ref Range    Glucose 107 (H) 65 - 99 mg/dL    BUN 20 8 - 23 mg/dL    Creatinine 0.95 0.57 - 1.00 mg/dL    eGFR Non African Am 59 (L) >60 mL/min/1.73    eGFR African Am 72 >60 mL/min/1.73    BUN/Creatinine Ratio 21.1 7.0 - 25.0    Sodium 139 136 - 145 mmol/L    Potassium 4.3 3.5 - 5.2 mmol/L    Chloride 99 98 - 107 mmol/L    Total CO2 25.0 22.0 - 29.0 mmol/L    Calcium 10.5 8.6 - 10.5 mg/dL    Total Protein 7.8 6.0 - 8.5 g/dL    Albumin 4.50 3.50 - 5.20 g/dL    Globulin 3.3 gm/dL    A/G Ratio 1.4 g/dL    Total Bilirubin 0.4 0.1 - 1.2 mg/dL    Alkaline Phosphatase 67 39 - 117 U/L    AST (SGOT) 11 1 - 32 U/L    ALT (SGPT) 7 1 - 33 U/L   C-Peptide   Result Value Ref Range    C-Peptide 2.6 1.1 - 4.4 ng/mL   Hemoglobin A1c   Result Value Ref Range     Hemoglobin A1C 6.40 (H) 4.80 - 5.60 %   Lipid Panel   Result Value Ref Range    Total Cholesterol 197 0 - 200 mg/dL    Triglycerides 139 0 - 150 mg/dL    HDL Cholesterol 36 (L) 40 - 60 mg/dL    VLDL Cholesterol 27.8 5 - 40 mg/dL    LDL Cholesterol  133 (H) 0 - 100 mg/dL   Comprehensive Thyroglobulin   Result Value Ref Range    Thyroglobulin Ab <1.0 IU/mL    Thyroglobulin 6.7 ng/mL    Thyroglobulin (TG-RANJEET) Comment ng/mL   T3, Free   Result Value Ref Range    T3, Free 3.0 2.0 - 4.4 pg/mL   T4 & TSH ([facility])   Result Value Ref Range    TSH 2.100 0.270 - 4.200 mIU/mL    T4, Total 6.67 4.50 - 11.70 mcg/dL   T4, Free   Result Value Ref Range    Free T4 1.21 0.93 - 1.70 ng/dL   MicroAlbumin, Urine, Random   Result Value Ref Range    Microalbumin, Urine 6.6 Not Estab. ug/mL       Physical Exam   Constitutional: She is oriented to person, place, and time. She appears well-developed and well-nourished. No distress.   HENT:   Head: Normocephalic and atraumatic.   Right Ear: External ear normal.   Left Ear: External ear normal.   Nose: Nose normal.   Mouth/Throat: Oropharynx is clear and moist. No oropharyngeal exudate.   Eyes: Conjunctivae and EOM are normal. Pupils are equal, round, and reactive to light. Right eye exhibits no discharge. Left eye exhibits no discharge. No scleral icterus.   Neck: Trachea normal, normal range of motion and full passive range of motion without pain. Neck supple. No JVD present. No tracheal tenderness present. Carotid bruit is not present. No tracheal deviation, no edema and no erythema present. No thyroid mass and no thyromegaly present.   Healed the scar of parathyroidectomy in lower neck.   Cardiovascular: Normal rate, regular rhythm, normal heart sounds and intact distal pulses.  Exam reveals no gallop and no friction rub.    No murmur heard.  Pulmonary/Chest: Effort normal and breath sounds normal. No stridor. No respiratory distress. She has no wheezes. She has no rales. She exhibits no  tenderness.   Abdominal: Soft. Bowel sounds are normal. She exhibits no distension and no mass. There is no tenderness. There is no rebound and no guarding. No hernia.   Musculoskeletal: Normal range of motion. She exhibits no edema, tenderness or deformity.   Lymphadenopathy:     She has no cervical adenopathy.   Neurological: She is alert and oriented to person, place, and time. She has normal reflexes. She displays normal reflexes. No cranial nerve deficit. She exhibits normal muscle tone. Coordination normal.   Skin: Skin is warm and dry. No rash noted. She is not diaphoretic. No erythema. No pallor.   Psychiatric: She has a normal mood and affect. Her behavior is normal. Judgment and thought content normal.   Nursing note and vitals reviewed.        Assessment/Plan   Diagnoses and all orders for this visit:    Controlled type 2 diabetes mellitus without complication, unspecified long term insulin use status  -     T4 & TSH ([facility]); Future  -     Uric Acid; Future  -     Vitamin D 25 Hydroxy; Future  -     Comprehensive Metabolic Panel; Future  -     C-Peptide; Future  -     Hemoglobin A1c; Future  -     Lipid Panel; Future  -     MicroAlbumin, Urine, Random; Future  -     Calcium, Ionized; Future  -     Phosphorus; Future  -     PTH, Intact; Future    Nontoxic multinodular goiter  -     T4 & TSH ([facility]); Future  -     Uric Acid; Future  -     Vitamin D 25 Hydroxy; Future  -     Comprehensive Metabolic Panel; Future  -     C-Peptide; Future  -     Hemoglobin A1c; Future  -     Lipid Panel; Future  -     MicroAlbumin, Urine, Random; Future  -     Calcium, Ionized; Future  -     Phosphorus; Future  -     PTH, Intact; Future    History of parathyroid surgery  -     T4 & TSH ([facility]); Future  -     Uric Acid; Future  -     Vitamin D 25 Hydroxy; Future  -     Comprehensive Metabolic Panel; Future  -     C-Peptide; Future  -     Hemoglobin A1c; Future  -     Lipid Panel; Future  -     MicroAlbumin, Urine, Random;  Future  -     Calcium, Ionized; Future  -     Phosphorus; Future  -     PTH, Intact; Future    Benign essential HTN  -     T4 & TSH ([facility]); Future  -     Uric Acid; Future  -     Vitamin D 25 Hydroxy; Future  -     Comprehensive Metabolic Panel; Future  -     C-Peptide; Future  -     Hemoglobin A1c; Future  -     Lipid Panel; Future  -     MicroAlbumin, Urine, Random; Future  -     Calcium, Ionized; Future  -     Phosphorus; Future  -     PTH, Intact; Future    Other hyperlipidemia  -     T4 & TSH ([facility]); Future  -     Uric Acid; Future  -     Vitamin D 25 Hydroxy; Future  -     Comprehensive Metabolic Panel; Future  -     C-Peptide; Future  -     Hemoglobin A1c; Future  -     Lipid Panel; Future  -     MicroAlbumin, Urine, Random; Future  -     Calcium, Ionized; Future  -     Phosphorus; Future  -     PTH, Intact; Future    Hypercalcemia  -     INVOKANA 300 MG tablet; Take 300 mg by mouth Daily.  -     T4 & TSH ([facility]); Future  -     Uric Acid; Future  -     Vitamin D 25 Hydroxy; Future  -     Comprehensive Metabolic Panel; Future  -     C-Peptide; Future  -     Hemoglobin A1c; Future  -     Lipid Panel; Future  -     MicroAlbumin, Urine, Random; Future  -     Calcium, Ionized; Future  -     Phosphorus; Future  -     PTH, Intact; Future    Postmenopausal osteoporosis  -     dexa bone density axial; Future    Other orders  -     JENTADUETO XR 2.5-1000 MG tablet sustained-release 24 hour; Take 2 tablets by mouth Daily.  -     vitamin D (ERGOCALCIFEROL) 37929 UNITS capsule capsule; Take 1 capsule by mouth 1 (One) Time Per Week. SUNDAYS  -     QUEtiapine (SEROQUEL) 50 MG tablet; Take 1 tablet by mouth Every Night.               In summary I saw and examined this 66-year-old female for above-mentioned problems.  I reviewed her laboratory evaluation of the 02/03/2017 and provided her with a hard copy of it.  Overall she is clinically and metabolically stable and therefore we will go ahead and continue all her  current prescriptions.  Also for her insomnia I gave her prescription for Seroquel 50 mg at bedtime.  She will see Ms. Esther Fermin in 4 months or sooner if needed with laboratory evaluation prior to each office visit.  Also because of the fact that she has not had a bone density in several years we will arrange for her to have a DEXA scan as well.

## 2017-02-20 DIAGNOSIS — R91.1 LUNG NODULE: ICD-10-CM

## 2017-02-21 ENCOUNTER — HOSPITAL ENCOUNTER (OUTPATIENT)
Dept: BONE DENSITY | Facility: HOSPITAL | Age: 67
Discharge: HOME | End: 2017-02-21
Attending: INTERNAL MEDICINE | Admitting: INTERNAL MEDICINE

## 2017-02-21 DIAGNOSIS — M81.0 POSTMENOPAUSAL OSTEOPOROSIS: ICD-10-CM

## 2017-02-21 PROCEDURE — 77080 DXA BONE DENSITY AXIAL: CPT

## 2017-06-06 ENCOUNTER — RESULTS ENCOUNTER (OUTPATIENT)
Dept: ENDOCRINOLOGY | Age: 67
End: 2017-06-06

## 2017-06-06 DIAGNOSIS — E04.2 NONTOXIC MULTINODULAR GOITER: ICD-10-CM

## 2017-06-06 DIAGNOSIS — I10 BENIGN ESSENTIAL HTN: ICD-10-CM

## 2017-06-06 DIAGNOSIS — Z98.890 HISTORY OF PARATHYROID SURGERY: ICD-10-CM

## 2017-06-06 DIAGNOSIS — E83.52 HYPERCALCEMIA: ICD-10-CM

## 2017-06-06 DIAGNOSIS — E11.9 CONTROLLED TYPE 2 DIABETES MELLITUS WITHOUT COMPLICATION, UNSPECIFIED LONG TERM INSULIN USE STATUS: ICD-10-CM

## 2017-06-06 DIAGNOSIS — E78.49 OTHER HYPERLIPIDEMIA: ICD-10-CM

## 2017-07-02 LAB
25(OH)D3+25(OH)D2 SERPL-MCNC: 39.5 NG/ML (ref 30–100)
ALBUMIN SERPL-MCNC: 4.5 G/DL (ref 3.5–5.2)
ALBUMIN/GLOB SERPL: 1.5 G/DL
ALP SERPL-CCNC: 77 U/L (ref 39–117)
ALT SERPL-CCNC: 9 U/L (ref 1–33)
AST SERPL-CCNC: 11 U/L (ref 1–32)
BILIRUB SERPL-MCNC: 0.3 MG/DL (ref 0.1–1.2)
BUN SERPL-MCNC: 16 MG/DL (ref 8–23)
BUN/CREAT SERPL: 16.7 (ref 7–25)
C PEPTIDE SERPL-MCNC: 3.8 NG/ML (ref 1.1–4.4)
CA-I SERPL ISE-MCNC: 5.7 MG/DL (ref 4.5–5.6)
CALCIUM SERPL-MCNC: 10 MG/DL (ref 8.6–10.5)
CHLORIDE SERPL-SCNC: 100 MMOL/L (ref 98–107)
CHOLEST SERPL-MCNC: 257 MG/DL (ref 0–200)
CO2 SERPL-SCNC: 26.3 MMOL/L (ref 22–29)
CREAT SERPL-MCNC: 0.96 MG/DL (ref 0.57–1)
GLOBULIN SER CALC-MCNC: 3.1 GM/DL
GLUCOSE SERPL-MCNC: 154 MG/DL (ref 65–99)
HBA1C MFR BLD: 7.97 % (ref 4.8–5.6)
HDLC SERPL-MCNC: 37 MG/DL (ref 40–60)
LDLC SERPL CALC-MCNC: 191 MG/DL (ref 0–100)
MICROALBUMIN UR-MCNC: <3 UG/ML
PHOSPHATE SERPL-MCNC: 3.1 MG/DL (ref 2.5–4.5)
POTASSIUM SERPL-SCNC: 4.7 MMOL/L (ref 3.5–5.2)
PROT SERPL-MCNC: 7.6 G/DL (ref 6–8.5)
PTH-INTACT SERPL-MCNC: 88 PG/ML (ref 15–65)
SODIUM SERPL-SCNC: 140 MMOL/L (ref 136–145)
T4 SERPL-MCNC: 6.19 MCG/DL (ref 4.5–11.7)
TRIGL SERPL-MCNC: 146 MG/DL (ref 0–150)
TSH SERPL DL<=0.005 MIU/L-ACNC: 2 MIU/ML (ref 0.27–4.2)
URATE SERPL-MCNC: 6.7 MG/DL (ref 2.4–5.7)
VLDLC SERPL CALC-MCNC: 29.2 MG/DL (ref 5–40)

## 2017-07-07 ENCOUNTER — OFFICE VISIT (OUTPATIENT)
Dept: ENDOCRINOLOGY | Age: 67
End: 2017-07-07

## 2017-07-07 VITALS
SYSTOLIC BLOOD PRESSURE: 122 MMHG | DIASTOLIC BLOOD PRESSURE: 74 MMHG | WEIGHT: 169 LBS | BODY MASS INDEX: 27.16 KG/M2 | HEIGHT: 66 IN

## 2017-07-07 DIAGNOSIS — E78.49 OTHER HYPERLIPIDEMIA: ICD-10-CM

## 2017-07-07 DIAGNOSIS — E83.52 HYPERCALCEMIA: ICD-10-CM

## 2017-07-07 DIAGNOSIS — I10 ESSENTIAL HYPERTENSION: ICD-10-CM

## 2017-07-07 DIAGNOSIS — E79.0 HYPERURICEMIA: ICD-10-CM

## 2017-07-07 DIAGNOSIS — E04.2 NONTOXIC MULTINODULAR GOITER: ICD-10-CM

## 2017-07-07 DIAGNOSIS — I10 BENIGN ESSENTIAL HTN: ICD-10-CM

## 2017-07-07 DIAGNOSIS — E11.8 TYPE 2 DIABETES MELLITUS WITH COMPLICATION, WITHOUT LONG-TERM CURRENT USE OF INSULIN (HCC): Primary | ICD-10-CM

## 2017-07-07 DIAGNOSIS — Z98.890 HISTORY OF PARATHYROID SURGERY: ICD-10-CM

## 2017-07-07 PROBLEM — J45.909 ASTHMA: Status: ACTIVE | Noted: 2017-07-07

## 2017-07-07 PROCEDURE — 99214 OFFICE O/P EST MOD 30 MIN: CPT | Performed by: NURSE PRACTITIONER

## 2017-07-07 RX ORDER — PRAVASTATIN SODIUM 80 MG/1
80 TABLET ORAL NIGHTLY
Qty: 30 TABLET | Refills: 5 | Status: SHIPPED | OUTPATIENT
Start: 2017-07-07 | End: 2018-02-14 | Stop reason: SDDI

## 2017-07-07 RX ORDER — ALLOPURINOL 100 MG/1
100 TABLET ORAL DAILY
Qty: 30 TABLET | Refills: 2 | Status: SHIPPED | OUTPATIENT
Start: 2017-07-07 | End: 2018-02-14 | Stop reason: SDDI

## 2017-07-07 RX ORDER — LINAGLIPTIN AND METFORMIN HYDROCHLORIDE 2.5; 1 MG/1; MG/1
2 TABLET, FILM COATED, EXTENDED RELEASE ORAL DAILY
Qty: 60 TABLET | Refills: 5 | Status: SHIPPED | OUTPATIENT
Start: 2017-07-07 | End: 2018-02-14 | Stop reason: SDUPTHER

## 2017-07-07 NOTE — PROGRESS NOTES
"Thomas Siegel is a 67 y.o. female is here today for follow-up.  Chief Complaint   Patient presents with   • Diabetes     recent labs, testing BG irregularly, pt did not bring meter   • Hyperlipidemia     pt is not taking pravastatin   • Hypertension   • Abnormal Calcium   • Goiter     /74  Ht 66\" (167.6 cm)  Wt 169 lb (76.7 kg)  BMI 27.28 kg/m2  Current Outpatient Prescriptions on File Prior to Visit   Medication Sig   • ADVAIR DISKUS 500-50 MCG/DOSE DISKUS Inhale 1 puff 2 (Two) Times a Day.   • amLODIPine (NORVASC) 5 MG tablet Take 5 mg by mouth Daily.   • BENICAR 40 MG tablet Take 40 mg by mouth Daily.   • Blood Glucose Monitoring Suppl (ACCU-CHEK SONIA PLUS) W/DEVICE kit    • cetirizine (zyrTEC) 10 MG tablet Take 10 mg by mouth Daily As Needed.   • fexofenadine (ALLEGRA) 180 MG tablet Take 180 mg by mouth Daily As Needed.   • fluticasone (FLONASE) 50 MCG/ACT nasal spray 2 sprays into each nostril Daily.   • GuaiFENesin ER (MUCINEX MAXIMUM STRENGTH) 1200 MG tablet sustained-release 12 hour Take 1 tablet by mouth As Needed.   • INVOKANA 300 MG tablet Take 300 mg by mouth Daily.   • JENTADUETO XR 2.5-1000 MG tablet sustained-release 24 hour Take 2 tablets by mouth Daily. (Patient taking differently: Take 1 tablet by mouth Daily.)   • QUEtiapine (SEROQUEL) 50 MG tablet Take 1 tablet by mouth Every Night. (Patient taking differently: Take 50 mg by mouth As Needed.)   • VENTOLIN  (90 BASE) MCG/ACT inhaler Inhale 2 puffs Every 6 (Six) Hours As Needed.   • vitamin D (ERGOCALCIFEROL) 53941 UNITS capsule capsule Take 1 capsule by mouth 1 (One) Time Per Week. SUNDAYS   • zolpidem (AMBIEN) 10 MG tablet Take 10 mg by mouth At Night As Needed.   • pravastatin (PRAVACHOL) 80 MG tablet Take 80 mg by mouth Every Night.     No current facility-administered medications on file prior to visit.      Family History   Problem Relation Age of Onset   • Cancer Mother      ovarian   • Heart disease Mother  "   • Heart disease Father    • Colon polyps Father      Social History   Substance Use Topics   • Smoking status: Former Smoker     Packs/day: 1.00     Years: 40.00     Types: Cigarettes     Quit date: 2002   • Smokeless tobacco: None   • Alcohol use Yes      Comment: 2 glasses of wine on the holidays     No Known Allergies      History of Present Illness  Encounter Diagnoses   Name Primary?   • Hypercalcemia    • Type 2 diabetes mellitus with complication, without long-term current use of insulin Yes   • Nontoxic multinodular goiter    • Hyperuricemia    • Other hyperlipidemia    • Benign essential HTN    • Essential hypertension    • History of parathyroid surgery      This is a 67-year-old female patient here today for routine follow-up visit.  She is being seen for the above-mentioned problems.  She had recent labs which were reviewed and she was provided a copy.  She is requesting we also fax a copy over to her primary care provider.  She admits to poor compliance with her medications as well as her diet.  She states the reason for increasing A1c is noncompliance.  She has a history of having a parathyroid adenoma removed in January.  She's also had history of a thyroid nodule biopsy that was benign.  She misunderstood the instructions on her Jentadueto and was only taking 1 pill a day.  She has a history of gout in her left knee.  She was noted to have elevated uric acid on her recent labs.  Her cholesterol also is uncontrolled and she states she has not been taking her pravastatin  The following portions of the patient's history were reviewed and updated as appropriate: allergies, current medications, past family history, past medical history, past social history, past surgical history and problem list.    Review of Systems   Constitutional: Negative for fatigue.   HENT: Negative for trouble swallowing.    Eyes: Negative for visual disturbance.   Respiratory: Negative for shortness of breath.     Cardiovascular: Negative for leg swelling.   Skin: Negative for wound.   Neurological: Negative for numbness.       Objective   Physical Exam   Constitutional: She is oriented to person, place, and time. She appears well-developed and well-nourished. No distress.   HENT:   Head: Normocephalic and atraumatic.   Right Ear: External ear normal.   Left Ear: External ear normal.   Nose: Nose normal.   Mouth/Throat: Oropharynx is clear and moist. No oropharyngeal exudate.   Eyes: EOM are normal. Pupils are equal, round, and reactive to light. Right eye exhibits no discharge. Left eye exhibits no discharge.   Neck: Trachea normal, normal range of motion and full passive range of motion without pain. Neck supple. No tracheal tenderness present. Carotid bruit is not present. No tracheal deviation, no edema and no erythema present. No thyroid mass and no thyromegaly present.   Cardiovascular: Normal rate, regular rhythm, normal heart sounds and intact distal pulses.  Exam reveals no gallop and no friction rub.    No murmur heard.  Pulmonary/Chest: Effort normal and breath sounds normal. No stridor. No respiratory distress. She has no wheezes. She has no rales.   Abdominal: Soft. Bowel sounds are normal. She exhibits no distension.   Musculoskeletal: Normal range of motion. She exhibits no edema or deformity.   Lymphadenopathy:     She has no cervical adenopathy.   Neurological: She is alert and oriented to person, place, and time.   Skin: Skin is warm and dry. No rash noted. She is not diaphoretic. No erythema. No pallor.   Psychiatric: She has a normal mood and affect. Her behavior is normal. Judgment and thought content normal.   Nursing note and vitals reviewed.    Results for orders placed or performed in visit on 06/06/17   T4 & TSH ([facility])   Result Value Ref Range    TSH 2.000 0.270 - 4.200 mIU/mL    T4, Total 6.19 4.50 - 11.70 mcg/dL   Uric Acid   Result Value Ref Range    Uric Acid 6.7 (H) 2.4 - 5.7 mg/dL    Vitamin D 25 Hydroxy   Result Value Ref Range    25 Hydroxy, Vitamin D 39.5 30.0 - 100.0 ng/mL   Comprehensive Metabolic Panel   Result Value Ref Range    Glucose 154 (H) 65 - 99 mg/dL    BUN 16 8 - 23 mg/dL    Creatinine 0.96 0.57 - 1.00 mg/dL    eGFR Non African Am 58 (L) >60 mL/min/1.73    eGFR African Am 70 >60 mL/min/1.73    BUN/Creatinine Ratio 16.7 7.0 - 25.0    Sodium 140 136 - 145 mmol/L    Potassium 4.7 3.5 - 5.2 mmol/L    Chloride 100 98 - 107 mmol/L    Total CO2 26.3 22.0 - 29.0 mmol/L    Calcium 10.0 8.6 - 10.5 mg/dL    Total Protein 7.6 6.0 - 8.5 g/dL    Albumin 4.50 3.50 - 5.20 g/dL    Globulin 3.1 gm/dL    A/G Ratio 1.5 g/dL    Total Bilirubin 0.3 0.1 - 1.2 mg/dL    Alkaline Phosphatase 77 39 - 117 U/L    AST (SGOT) 11 1 - 32 U/L    ALT (SGPT) 9 1 - 33 U/L   C-Peptide   Result Value Ref Range    C-Peptide 3.8 1.1 - 4.4 ng/mL   Hemoglobin A1c   Result Value Ref Range    Hemoglobin A1C 7.97 (H) 4.80 - 5.60 %   Lipid Panel   Result Value Ref Range    Total Cholesterol 257 (H) 0 - 200 mg/dL    Triglycerides 146 0 - 150 mg/dL    HDL Cholesterol 37 (L) 40 - 60 mg/dL    VLDL Cholesterol 29.2 5 - 40 mg/dL    LDL Cholesterol  191 (H) 0 - 100 mg/dL   Calcium, Ionized   Result Value Ref Range    Ionized Calcium 5.7 (H) 4.5 - 5.6 mg/dL   Phosphorus   Result Value Ref Range    Phosphorus 3.1 2.5 - 4.5 mg/dL   PTH, Intact   Result Value Ref Range    PTH, Intact 88 (H) 15 - 65 pg/mL   MicroAlbumin, Urine, Random   Result Value Ref Range    Microalbumin, Urine <3.0 Not Estab. ug/mL         Assessment/Plan   Roger was seen today for diabetes, hyperlipidemia, hypertension, abnormal calcium and goiter.    Diagnoses and all orders for this visit:    Type 2 diabetes mellitus with complication, without long-term current use of insulin    Hypercalcemia  -     pravastatin (PRAVACHOL) 80 MG tablet; Take 1 tablet by mouth Every Night.    Nontoxic multinodular goiter    Other orders  -     JENTADUETO XR 2.5-1000 MG tablet  sustained-release 24 hour; Take 2 tablets by mouth Daily.      In summary, patient was seen and examined.  Metabolically she is stable.  Her blood pressure is an acceptable range.  Her weight is stable.  She is often taking her medications as prescribed before her hemoglobin A1c has gone up significantly and is now above goal of less than 7.  She has not been taking her medications as prescribed and will review her medications with instructions on how to take them.  She was provided some samples of Jentadueto with instruction to dosing.  She was also given a written prescription for allopurinol for her elevated uric acid however she is not sure she wants to start taking it.  She was restarted on her Pravachol for dyslipidemia.  She's not been checking her blood sugars and did not bring her blood glucose meter to today's visit.  She will follow-up her next visit with Dr. Kitchen with labs prior.  Her parathyroid hormone was elevated however her vitamin D and calcium were in satisfactory range.  Her parathyroid scan as well as her thyroid biopsy results were reviewed at today's visit.

## 2017-07-10 DIAGNOSIS — E34.9 INCREASED PTH LEVEL: Primary | ICD-10-CM

## 2017-07-10 PROBLEM — R79.89 ELEVATED PTHRP LEVEL: Status: ACTIVE | Noted: 2017-07-10

## 2017-07-12 ENCOUNTER — TELEPHONE (OUTPATIENT)
Dept: ENDOCRINOLOGY | Age: 67
End: 2017-07-12

## 2017-07-12 NOTE — TELEPHONE ENCOUNTER
----- Message from OLGA Claire sent at 7/10/2017  9:04 AM EDT -----  Let pt know that I spoke with dr pete regarding her labs. He feels she should repeat parathyroid scan to make sure nothing was missed      I spoke with the patient and let her know that we do indeed want her to repeat the parathyroid scan. She expressed understanding

## 2017-07-28 ENCOUNTER — TELEPHONE (OUTPATIENT)
Dept: ENDOCRINOLOGY | Age: 67
End: 2017-07-28

## 2017-07-28 DIAGNOSIS — R91.1 PULMONARY NODULE: Primary | ICD-10-CM

## 2017-07-28 NOTE — TELEPHONE ENCOUNTER
This patient was recently seen in this office for a follow-up for parathyroid adenoma.  She has a history of parathyroid resection done by Dr. Silverio.  Her parathyroid hormone remains elevated.  She also has a history of a pulmonary nodule that was prescribed that she have a chest CT scan however patient did not have this done.  Dr. Dasilva the radiologist at [facility] called our office today to discuss her parathyroid scan results.  She does have another parathyroid adenoma on the left.  She also has a pulmonary nodule with a dominant adenopathy.  She also has some vocal cord paralysis.  Her parathyroid scan was reviewed and will be forwarded to Dr. Maria C Silverio's office.  I've also discussed results with Dr. Kitchen and we will  refer her to Dr. Khalil thoracic surgeon.  I have spoke with the patient to inform her that she will need to have a contrasted CT scan done of the chest prior to her visit with Dr. Khalil and that it was very important that she follow-up regarding this pulmonary nodule.  Patient understood instructions.  Orders have been placed and resulted in reviewed with Dr. Kitchen

## 2017-08-09 ENCOUNTER — HOSPITAL ENCOUNTER (OUTPATIENT)
Dept: CT IMAGING | Facility: HOSPITAL | Age: 67
Discharge: HOME | End: 2017-08-09
Admitting: NURSE PRACTITIONER

## 2017-08-09 DIAGNOSIS — R91.1 PULMONARY NODULE: ICD-10-CM

## 2017-08-09 LAB — CREAT BLDA-MCNC: 1 MG/DL (ref 0.6–1.3)

## 2017-08-09 PROCEDURE — 71260 CT THORAX DX C+: CPT

## 2017-08-09 PROCEDURE — 82565 ASSAY OF CREATININE: CPT

## 2017-08-09 PROCEDURE — 0 IOPAMIDOL 61 % SOLUTION: Performed by: NURSE PRACTITIONER

## 2017-08-09 RX ADMIN — IOPAMIDOL 75 ML: 612 INJECTION, SOLUTION INTRAVENOUS at 10:08

## 2017-08-14 ENCOUNTER — TELEPHONE (OUTPATIENT)
Dept: ENDOCRINOLOGY | Age: 67
End: 2017-08-14

## 2017-08-14 DIAGNOSIS — R91.1 LUNG NODULE: Primary | ICD-10-CM

## 2017-08-14 NOTE — TELEPHONE ENCOUNTER
----- Message from OLGA Claire sent at 8/14/2017 11:51 AM EDT -----  Good   ----- Message -----     From: Bhavana Le MA     Sent: 8/14/2017  11:45 AM       To: OLGA Claire    I spoke with the patient this morning and she stated that she has an appt with Dr. Khalil on Wednesday.   ----- Message -----     From: OLGA Claire     Sent: 8/14/2017  11:33 AM       To: Bhavana Le MA    Blood patient know that I reviewed her CT of her chest and please mail her a copy.  I have discussed with Dr. Kitchen at a recommendation with either that she have a PET scan or A CT in 3 months.  Rather than weight I want her to see Dr. Medardo Khalil I have sent a referral.  He is a cardiothoracic surgeon and she can discuss plan of treatment with him.

## 2017-08-16 ENCOUNTER — TRANSCRIBE ORDERS (OUTPATIENT)
Dept: ADMINISTRATIVE | Facility: HOSPITAL | Age: 67
End: 2017-08-16

## 2017-08-16 ENCOUNTER — OFFICE VISIT (OUTPATIENT)
Dept: OTHER | Facility: HOSPITAL | Age: 67
End: 2017-08-16
Attending: INTERNAL MEDICINE

## 2017-08-16 ENCOUNTER — PREP FOR SURGERY (OUTPATIENT)
Dept: OTHER | Facility: HOSPITAL | Age: 67
End: 2017-08-16

## 2017-08-16 ENCOUNTER — OFFICE VISIT (OUTPATIENT)
Dept: OTHER | Facility: HOSPITAL | Age: 67
End: 2017-08-16
Attending: THORACIC SURGERY (CARDIOTHORACIC VASCULAR SURGERY)

## 2017-08-16 VITALS
WEIGHT: 166 LBS | RESPIRATION RATE: 16 BRPM | HEART RATE: 90 BPM | SYSTOLIC BLOOD PRESSURE: 139 MMHG | OXYGEN SATURATION: 99 % | DIASTOLIC BLOOD PRESSURE: 91 MMHG | HEIGHT: 66 IN | TEMPERATURE: 97.5 F | BODY MASS INDEX: 26.68 KG/M2

## 2017-08-16 VITALS
OXYGEN SATURATION: 99 % | BODY MASS INDEX: 26.68 KG/M2 | WEIGHT: 166 LBS | SYSTOLIC BLOOD PRESSURE: 139 MMHG | HEIGHT: 66 IN | TEMPERATURE: 97.5 F | DIASTOLIC BLOOD PRESSURE: 91 MMHG | RESPIRATION RATE: 16 BRPM | HEART RATE: 90 BPM

## 2017-08-16 DIAGNOSIS — R59.0 MEDIASTINAL LYMPHADENOPATHY: ICD-10-CM

## 2017-08-16 DIAGNOSIS — R91.1 PULMONARY NODULE, LEFT: Primary | ICD-10-CM

## 2017-08-16 DIAGNOSIS — R91.1 PULMONARY NODULE, LEFT: ICD-10-CM

## 2017-08-16 DIAGNOSIS — R91.1 LUNG NODULE: Primary | ICD-10-CM

## 2017-08-16 DIAGNOSIS — R79.1 ABNORMAL COAGULATION PROFILE: ICD-10-CM

## 2017-08-16 DIAGNOSIS — R91.1 PULMONARY NODULE: Primary | ICD-10-CM

## 2017-08-16 PROCEDURE — G0463 HOSPITAL OUTPT CLINIC VISIT: HCPCS

## 2017-08-16 PROCEDURE — 99214 OFFICE O/P EST MOD 30 MIN: CPT | Performed by: INTERNAL MEDICINE

## 2017-08-16 PROCEDURE — 99214 OFFICE O/P EST MOD 30 MIN: CPT | Performed by: THORACIC SURGERY (CARDIOTHORACIC VASCULAR SURGERY)

## 2017-08-16 RX ORDER — CEFAZOLIN SODIUM 2 G/100ML
2 INJECTION, SOLUTION INTRAVENOUS ONCE
Status: CANCELLED | OUTPATIENT
Start: 2017-08-16 | End: 2017-08-16

## 2017-08-16 RX ORDER — SODIUM CHLORIDE 0.9 % (FLUSH) 0.9 %
1-10 SYRINGE (ML) INJECTION AS NEEDED
Status: CANCELLED | OUTPATIENT
Start: 2017-08-16

## 2017-08-16 RX ORDER — HEPARIN SODIUM 5000 [USP'U]/ML
5000 INJECTION, SOLUTION INTRAVENOUS; SUBCUTANEOUS ONCE
Status: CANCELLED | OUTPATIENT
Start: 2017-08-16 | End: 2017-08-16

## 2017-08-16 NOTE — PROGRESS NOTES
Subjective   Patient ID: Roger Siegel is a 67 y.o. female is being seen for consultation today at the request of Rebecca Lin MD    History of Present Illness  Dear Colleague,  Roger Siegel was seen in the [facility] for evaluation of left upper lobe pulmonary nodule and mediastinal lymphadenopathy.  I have performed a focused physical examination and review of her history and films.  As you recall this is a 67-year-old female with a history of hypercalcemia and diagnosed with a parathyroid adenoma that was resected in January 2017 by Dr. Maria C Silverio.  She has been surveilled by her endocrinologist and it was observed that she had elevation in her PTH and a repeat parathyroid nuclear scan was performed which revealed uptake in the cervical parathyroid adenoma as well as left upper lobe pulmonary nodule and AP window lymph node enlargement.  Additionally there was evidence of left sided vocal cord paralysis or paresis.  She reports an intermittent productive cough that is been present for several months and is related to her postnasal drip.  She denies any complaints of fever, chills, hemoptysis, pleuritic chest pain, shortness of air, dyspnea with exertion, night sweats, hoarseness, or unintentional weight loss. Underlying medical conditions including hypertension, diabetes and hyperlipidemia remain stable.  She has no other somatic complaints or alleviating or exacerbating factors aside from those mentioned above.    The following portions of the patient's history were reviewed and updated as appropriate: allergies, current medications, past family history, past medical history, past social history, past surgical history and problem list.  Review of Systems   Musculoskeletal: Positive for joint pain.   Gastrointestinal: Positive for flatus.   Allergic/Immunologic: Positive for environmental allergies.   All other systems reviewed and are negative.    Patient Active Problem  List   Diagnosis   • Diabetes mellitus   • Benign essential HTN   • HLD (hyperlipidemia)   • Nontoxic multinodular goiter   • History of parathyroid surgery   • Colon polyps; last cscope by Dr. Turcios 2011; she is due for a screening cscope   • FHx: ovarian cancer   • 2cm right thyroid nodule, 1.5cm left thyroid nodule on US   • Lung nodule 6mm incidentally noted on SPECT with recommended f/u   • Heart murmur, chronic per patient   • Need for screening mammogram   • Left hip pain chronic x 2-3 years   • Asthma   • Hypertension   • Hyperuricemia   • Hypercalcemia   • Increased PTH level     Past Medical History:   Diagnosis Date   • Asthma    • Bilateral bunions    • Colon polyp    • GERD (gastroesophageal reflux disease)    • Goiter     NODULAR THYROID   • Heart murmur    • Hypercalcemia    • Hyperlipidemia    • Hyperparathyroidism    • Hypertension    • Left hip pain    • Parathyroid tumor    • PONV (postoperative nausea and vomiting)    • Seasonal allergies    • Type 2 diabetes mellitus    • Vitamin D deficiency      Past Surgical History:   Procedure Laterality Date   • APPENDECTOMY N/A 1978   • CATARACT EXTRACTION Bilateral 2016   • CATARACT EXTRACTION WITH INTRAOCULAR LENS IMPLANT Bilateral    • COLONOSCOPY W/ POLYPECTOMY N/A 2011    Dr. Turcios   • PARATHYROIDECTOMY      right side   • SINUS SURGERY      x 3   • THYROIDECTOMY Right 1/19/2017    Procedure: RIGHT SUPERIOR PARATHYROIDECTOMY;  Surgeon: Maria C Silverio MD;  Location: [facility] [place];  Service:    • TONSILLECTOMY      still has part of a tonsil there   • TOTAL ABDOMINAL HYSTERECTOMY       Family History   Problem Relation Age of Onset   • Cancer Mother      ovarian   • Heart disease Mother    • Heart disease Father    • Colon polyps Father      Social History     Social History   • Marital status: Unknown     Spouse name: N/A   • Number of children: N/A   • Years of education: N/A     Occupational History   • Not on file.     Social History Main  Topics   • Smoking status: Former Smoker     Packs/day: 1.00     Years: 40.00     Types: Cigarettes     Quit date: 2002   • Smokeless tobacco: Not on file   • Alcohol use Yes      Comment: 2 glasses of wine on the holidays   • Drug use: No   • Sexual activity: Defer     Other Topics Concern   • Not on file     Social History Narrative       Current Outpatient Prescriptions:   •  ADVAIR DISKUS 500-50 MCG/DOSE DISKUS, Inhale 1 puff 2 (Two) Times a Day., Disp: , Rfl:   •  allopurinol (ZYLOPRIM) 100 MG tablet, Take 1 tablet by mouth Daily., Disp: 30 tablet, Rfl: 2  •  amLODIPine (NORVASC) 5 MG tablet, Take 5 mg by mouth Daily., Disp: , Rfl:   •  BENICAR 40 MG tablet, Take 40 mg by mouth Daily., Disp: , Rfl:   •  Blood Glucose Monitoring Suppl (ACCU-CHEK SONIA PLUS) W/DEVICE kit, , Disp: , Rfl:   •  cetirizine (zyrTEC) 10 MG tablet, Take 10 mg by mouth Daily As Needed., Disp: , Rfl:   •  fexofenadine (ALLEGRA) 180 MG tablet, Take 180 mg by mouth Daily As Needed., Disp: , Rfl:   •  fluticasone (FLONASE) 50 MCG/ACT nasal spray, 2 sprays into each nostril Daily., Disp: , Rfl:   •  GuaiFENesin ER (MUCINEX MAXIMUM STRENGTH) 1200 MG tablet sustained-release 12 hour, Take 1 tablet by mouth As Needed., Disp: , Rfl:   •  INVOKANA 300 MG tablet, Take 300 mg by mouth Daily., Disp: 30 tablet, Rfl: 5  •  JENTADUETO XR 2.5-1000 MG tablet sustained-release 24 hour, Take 2 tablets by mouth Daily., Disp: 60 tablet, Rfl: 5  •  pravastatin (PRAVACHOL) 80 MG tablet, Take 1 tablet by mouth Every Night., Disp: 30 tablet, Rfl: 5  •  QUEtiapine (SEROQUEL) 50 MG tablet, Take 1 tablet by mouth Every Night. (Patient taking differently: Take 50 mg by mouth As Needed.), Disp: 30 tablet, Rfl: 11  •  VENTOLIN  (90 BASE) MCG/ACT inhaler, Inhale 2 puffs Every 6 (Six) Hours As Needed., Disp: , Rfl:   •  vitamin D (ERGOCALCIFEROL) 79776 UNITS capsule capsule, Take 1 capsule by mouth 1 (One) Time Per Week. SUNDAYS, Disp: 13 capsule, Rfl: 3  •   zolpidem (AMBIEN) 10 MG tablet, Take 10 mg by mouth At Night As Needed., Disp: , Rfl:   No Known Allergies     Objective   Vitals:    08/16/17 0934   BP: 139/91   Pulse: 90   Resp: 16   Temp: 97.5 °F (36.4 °C)   SpO2: 99%     Physical Exam   Constitutional: She is oriented to person, place, and time. Vital signs are normal. She appears well-developed and well-nourished. No distress.   HENT:   Head: Normocephalic and atraumatic.   Eyes: EOM are normal. Pupils are equal, round, and reactive to light.   Neck: Normal range of motion. Neck supple. No JVD present. No tracheal deviation present.   Cardiovascular: Normal rate, regular rhythm, normal heart sounds and intact distal pulses.    No murmur heard.  Pulmonary/Chest: Effort normal and breath sounds normal. She has no wheezes. She has no rhonchi. She has no rales. She exhibits no tenderness.   Abdominal: Soft. There is no tenderness.   Musculoskeletal: Normal range of motion. She exhibits no edema or tenderness.   Lymphadenopathy:     She has no cervical adenopathy.   Neurological: She is alert and oriented to person, place, and time. She has normal strength.   Skin: Skin is warm and dry. No rash noted. No cyanosis or erythema.   Psychiatric: She has a normal mood and affect. Her behavior is normal.     Independent Review of Radiographic Studies:    CT Chest With Contrast [[id number]] (Order [id number])   Status: Final result   Study Notes      Cristopher Lewis on 8/9/2017 10:06 AM   Follow up pulmonary nodule seen on imaging done at [facility]  Obtaining imaging      Appointment Information   PACS Images   Radiology Images   Study Result   ?CT CHEST WITH IV CONTRAST      HISTORY: 67-year-old female with history of a parathyroid adenoma.  Pulmonary nodule and enlarged mediastinal node.      TECHNIQUE: 3 mm images were obtained through the chest after the  administration of IV contrast. Compared with an outside facility nuclear  parathyroid scan with SPECT/CT  imaging from 07/26/2017.      FINDINGS: There is no significant interval change in the irregular 1 cm  pulmonary nodule in the perihilar region of the left upper lobe. There  is also no change in size of the 2.3 x 2.0 cm AP window node. Shotty  mediastinal and hilar nodes appear stable. There are no new pulmonary  opacities and there are no pleural or pericardial effusions. The  pulmonary arteries are enlarged measuring 3.4 cm transversely consistent  with pulmonary arterial hypertension.      IMPRESSION:  Stable irregular 1 cm pulmonary nodule in the perihilar  region of the left upper lobe and the 2.3 x 2.0 cm AP window node is  stable as well. Further characterization with PET/CT should be  considered or short-term follow-up is recommended with chest CT in 3  months.      This report was finalized on 8/11/2017 4:45 PM by Dr. Angeli Cullen MD.               Assessment/Plan   Assessment:  Left upper lobe pulmonary nodule with concomitant aortopulmonary window mediastinal lymphadenopathy.  Prior history of tobacco use stopped in 2002.  Intermittent productive cough secondary to sinus drainage.  History of recent parathyroidectomy for benign parathyroid adenoma.    Plan:  Our plan at this time is to proceed with a navigational bronchoscopy with the assistance of our pulmonary colleagues.  If this is nondiagnostic our next steps would be a left video-assisted thoracoscopy and lymph node biopsies.  Our concern is that this nodule may represent a malignant process such as a primary bronchogenic carcinoma or possibly lymphoma.  Nonmalignant processes such as sarcoidosis and histoplasmosis cannot be excluded at this time however they are lower on her differential diagnosis.  The patient is a former smoker and she stopped in 2002 making these most recent findings more concerning.  I do not believe that these findings are related to her parathyroid disease.  Once her bronchoscopy has been completed she will follow-up in our  office to discuss the results and any plans moving forward.Should you have any questions or concerns regarding your patient's care, please do not hesitate to contact me.  Sincerely, Rupert Wright M.D.  There are no diagnoses linked to this encounter.

## 2017-08-16 NOTE — PROGRESS NOTES
Subjective .     REASON FOR CONSULTATION:  Pulmonary nodule with enlarged mediastinal lymph node.  Provide an opinion on any further workup or treatment                             REQUESTING PHYSICIAN:  Dr Gera Kitchen/ [facility]    RECORDS OBTAINED:  Records of the patients history including those obtained from the referring provider were reviewed and summarized in detail.    HISTORY OF PRESENT ILLNESS:  The patient is a 67 y.o. year old female with the above-mentioned history is seen in initial consultation in the lung care clinic alongside Dr. Wright in thoracic surgery.  The patient was found to have hyperparathyroidism and underwent a parathyroid scan on 9/23/16 at [place] [address] showing an 8 mm right-sided parathyroid adenoma and a 6 mm left upper lobe pulmonary nodule.  She did undergo resection of the right superior parathyroid adenoma on 1/19/17 with Dr. Silverio, confirmed on pathology.  She has been seeing Dr. Kitchen in endocrinology in follow-up.  A repeat parathyroid scan was obtained 7/26/17 that showed resection of the right-sided parathyroid adenoma but did show a left sided parathyroid adenoma.  There were incidental findings noted with an increase in size of a left mid lung nodule up to 1 cm and evidence of a 2.25 x 3.5 cm left mediastinal lymph node, dimension potentially decreased slightly in size compared to 9/23/16 and an additional lymph node near the aortic arch measuring 1.5 cm that had increased slightly since the prior study.  There were also calcified right hilar lymph nodes.  The patient underwent a dedicated CT of the chest on 8/9/17 that confirmed the irregular 1 cm pulmonary nodule in the left upper lobe perihilar region as well as a 2.3 x 2.0 cm AP window lymph node.  The patient was subsequently referred to the lung care clinic for further evaluation.  It is noted that the patient has a smoking history of 1 pack per day for 15 years, quit in  2002.    Today, the patient has no significant complaints.  She does have some chronic constipation, chronic sinus discharge.  She has some intermittent difficulty with asthma and wheezing but none recently.  She does note a normal appetite and has lost around 10-14 pounds over the past year but attributes this to better management of her diabetes.      Past Medical History:   Diagnosis Date   • Asthma    • Bilateral bunions    • Colon polyp    • GERD (gastroesophageal reflux disease)    • Goiter     NODULAR THYROID   • Heart murmur    • Hypercalcemia    • Hyperlipidemia    • Hyperparathyroidism    • Hypertension    • Left hip pain    • Parathyroid tumor    • PONV (postoperative nausea and vomiting)    • Seasonal allergies    • Type 2 diabetes mellitus    • Vitamin D deficiency      Past Surgical History:   Procedure Laterality Date   • APPENDECTOMY N/A 1978   • CATARACT EXTRACTION Bilateral 2016   • CATARACT EXTRACTION WITH INTRAOCULAR LENS IMPLANT Bilateral    • COLONOSCOPY W/ POLYPECTOMY N/A 2011    Dr. Turcios   • PARATHYROIDECTOMY      right side   • SINUS SURGERY      x 3   • THYROIDECTOMY Right 1/19/2017    Procedure: RIGHT SUPERIOR PARATHYROIDECTOMY;  Surgeon: Maria C Silverio MD;  [place] [facility] [place] [facility] [place]:    • TONSILLECTOMY      still has part of a tonsil there   • TOTAL ABDOMINAL HYSTERECTOMY         MEDICATIONS    Current Outpatient Prescriptions:   •  ADVAIR DISKUS 500-50 MCG/DOSE DISKUS, Inhale 1 puff 2 (Two) Times a Day., Disp: , Rfl:   •  allopurinol (ZYLOPRIM) 100 MG tablet, Take 1 tablet by mouth Daily., Disp: 30 tablet, Rfl: 2  •  amLODIPine (NORVASC) 5 MG tablet, Take 5 mg by mouth Daily., Disp: , Rfl:   •  BENICAR 40 MG tablet, Take 40 mg by mouth Daily., Disp: , Rfl:   •  Blood Glucose Monitoring Suppl (ACCU-CHEK SONIA PLUS) W/DEVICE kit, , Disp: , Rfl:   •  cetirizine (zyrTEC) 10 MG tablet, Take 10 mg by mouth Daily As Needed., Disp: , Rfl:   •  fexofenadine (ALLEGRA) 180 MG tablet,  Take 180 mg by mouth Daily As Needed., Disp: , Rfl:   •  fluticasone (FLONASE) 50 MCG/ACT nasal spray, 2 sprays into each nostril Daily., Disp: , Rfl:   •  GuaiFENesin ER (MUCINEX MAXIMUM STRENGTH) 1200 MG tablet sustained-release 12 hour, Take 1 tablet by mouth As Needed., Disp: , Rfl:   •  INVOKANA 300 MG tablet, Take 300 mg by mouth Daily., Disp: 30 tablet, Rfl: 5  •  JENTADUETO XR 2.5-1000 MG tablet sustained-release 24 hour, Take 2 tablets by mouth Daily., Disp: 60 tablet, Rfl: 5  •  pravastatin (PRAVACHOL) 80 MG tablet, Take 1 tablet by mouth Every Night., Disp: 30 tablet, Rfl: 5  •  QUEtiapine (SEROQUEL) 50 MG tablet, Take 1 tablet by mouth Every Night. (Patient taking differently: Take 50 mg by mouth As Needed.), Disp: 30 tablet, Rfl: 11  •  VENTOLIN  (90 BASE) MCG/ACT inhaler, Inhale 2 puffs Every 6 (Six) Hours As Needed., Disp: , Rfl:   •  vitamin D (ERGOCALCIFEROL) 45852 UNITS capsule capsule, Take 1 capsule by mouth 1 (One) Time Per Week. SUNDAYS, Disp: 13 capsule, Rfl: 3  •  zolpidem (AMBIEN) 10 MG tablet, Take 10 mg by mouth At Night As Needed., Disp: , Rfl:     ALLERGIES:   No Known Allergies    SOCIAL HISTORY:       Social History     Social History   • Marital status: Unknown     Spouse name: N/A   • Number of children: N/A   • Years of education: N/A     Occupational History   • Not on file.     Social History Main Topics   • Smoking status: Former Smoker     Packs/day: 1.00     Years: 40.00     Types: Cigarettes     Quit date: 2002   • Smokeless tobacco: Not on file   • Alcohol use Yes      Comment: 2 glasses of wine on the holidays   • Drug use: No   • Sexual activity: Defer     Other Topics Concern   • Not on file     Social History Narrative         FAMILY HISTORY:  Family History   Problem Relation Age of Onset   • Cancer Mother      ovarian   • Heart disease Mother    • Heart disease Father    • Colon polyps Father        REVIEW OF SYSTEMS:  A comprehensive review of systems was  "performed and was negative except as mentioned above in the history of present illness.    Objective    Vitals:    08/16/17 1015   BP: 139/91   Pulse: 90   Resp: 16   Temp: 97.5 °F (36.4 °C)   TempSrc: Oral   SpO2: 99%  Comment: room air   Weight: 166 lb (75.3 kg)   Height: 66\" (167.6 cm)     No flowsheet data found.      PHYSICAL EXAM:    GENERAL:  Well-developed, well-nourished in no acute distress.   SKIN:  Warm, dry without rashes, purpura or petechiae.  HEAD:  Normocephalic.  EYES:  Pupils equal, round and reactive to light.  EOMs intact.  Conjunctivae normal.  EARS:  Hearing intact.  NOSE:  Septum midline.  No excoriations or nasal discharge.  MOUTH:  Tongue is well-papillated; no stomatitis or ulcers.  Lips normal.  THROAT:  Oropharynx without lesions or exudates.  NECK:  Supple with good range of motion; no thyromegaly or masses, no JVD.  LYMPHATICS:  No cervical, supraclavicular, axillary or inguinal adenopathy.  CHEST:  Lungs clear to percussion and auscultation. Good airflow.  CARDIAC:  Regular rate and rhythm without murmurs, rubs or gallops. Normal S1,S2.  ABDOMEN:  Soft, nontender with no organomegaly or masses.  EXTREMITIES:  No clubbing, cyanosis or edema.  NEUROLOGICAL:  Cranial Nerves II-XII grossly intact.  No focal neurological deficits.  PSYCHIATRIC:  Normal affect and mood.      RECENT LABS:        WBC   Date Value Ref Range Status   01/16/2017 6.95 4.50 - 10.70 10*3/mm3 Final     Hemoglobin   Date Value Ref Range Status   01/16/2017 13.3 11.9 - 15.5 g/dL Final     Platelets   Date Value Ref Range Status   01/16/2017 414 140 - 500 10*3/mm3 Final     I did personally review the CT images in the computer today and discussed the patient at length with Dr. Wright in thoracic surgery.    Assessment/Plan    1. Left upper lobe pulmonary nodule with AP window lymphadenopathy: The patient has a 1 cm left upper lobe nodule which appears to have been present since a parathyroid scan 9/23/16 at which time " it measured 6 mm.  There is also evidence currently of an AP window lymph node measuring 2.3 cm which may have been present on the previous parathyroid scan 9/23/16 but was not noted in the report.  More recent parathyroid scan notes that this area may have decreased in size actually.  There was however a slight increase in a lymph node near the aortic arch measuring 1.5 cm.  Certainly, in a patient with a smoking history (1 pack per day ×15 years quit in 2002), there is concern regarding the potential for a primary lung cancer.  I did discuss the situation with Dr. Wright in thoracic surgery.  We both agree that ET findings indicate possible evidence of stage III disease in which situation the patient would not be an operative candidate but would be a candidate for concurrent chemoradiation.  We have not yet however diagnosed of malignancy and we will need to obtain a tissue diagnosis.  We did discuss the potential use of navigational bronchoscopy to biopsy the primary lung nodule area and the patient is actually being seen by Dr. Bryant in pulmonary today to potentially facilitate that procedure.  We'll follow-up that biopsy result.  We will attempt to order a PET scan however it is unclear whether that will be approved prior to obtaining a tissue diagnosis.  Dr. Wright feels that the AP window lymph node would be accessible for biopsy only via VATS.  Knowledge of the status of that lymph node will determine her treatment course.  I did review all of this as well with the patient and her .  They are in agreement to proceed first with navigational bronchoscopy, PET scan, and then possibly left VATS.  We did discuss the possibility of concurrent chemoradiation either with weekly low-dose carboplatin and Taxol or with cisplatin/etoposide chemotherapy if we identify stage III non-small cell lung cancer.  I will not yet make a follow-up appointment in our office however Dr. Wright will refer the patient back to me  if we do identify malignancy.  There is certainly the possibility of benign/granulomatous disease as a cause for the findings as well.  The patient and her  expressed understanding.    Plan:  1. Navigational bronchoscopy with pulmonary, possibly within the next week  2. PET scan if approved by insurance prior to tissue diagnosis  3. Potential subsequent left VATS with Dr. Wright for AP window lymph node sampling.  4. We will not yet schedule a follow-up appointment in our office however will await referral back if malignancy is identified.

## 2017-08-16 NOTE — H&P
H & P  Encounter Date: 8/16/2017  Rupert Wright MD   Thoracic Surgery   Expand All Collapse All    []Hide copied text     Subjective      Patient ID: Roger Siegel is a 67 y.o. female is being seen for consultation today at the request of Rebecca Lin MD     History of Present Illness  Dear Colleague,  Roger Siegel was seen in the [facility] for evaluation of left upper lobe pulmonary nodule and mediastinal lymphadenopathy.  I have performed a focused physical examination and review of her history and films.  As you recall this is a 67-year-old female with a history of hypercalcemia and diagnosed with a parathyroid adenoma that was resected in January 2017 by Dr. Maria C Silverio.  She has been surveilled by her endocrinologist and it was observed that she had elevation in her PTH and a repeat parathyroid nuclear scan was performed which revealed uptake in the cervical parathyroid adenoma as well as left upper lobe pulmonary nodule and AP window lymph node enlargement.  Additionally there was evidence of left sided vocal cord paralysis or paresis.  She reports an intermittent productive cough that is been present for several months and is related to her postnasal drip.  She denies any complaints of fever, chills, hemoptysis, pleuritic chest pain, shortness of air, dyspnea with exertion, night sweats, hoarseness, or unintentional weight loss. Underlying medical conditions including hypertension, diabetes and hyperlipidemia remain stable.  She has no other somatic complaints or alleviating or exacerbating factors aside from those mentioned above.     The following portions of the patient's history were reviewed and updated as appropriate: allergies, current medications, past family history, past medical history, past social history, past surgical history and problem list.  Review of Systems   Musculoskeletal: Positive for joint pain.   Gastrointestinal: Positive for flatus.    Allergic/Immunologic: Positive for environmental allergies.   All other systems reviewed and are negative.         Patient Active Problem List   Diagnosis   • Diabetes mellitus   • Benign essential HTN   • HLD (hyperlipidemia)   • Nontoxic multinodular goiter   • History of parathyroid surgery   • Colon polyps; last cscope by Dr. Turcios 2011; she is due for a screening cscope   • FHx: ovarian cancer   • 2cm right thyroid nodule, 1.5cm left thyroid nodule on US   • Lung nodule 6mm incidentally noted on SPECT with recommended f/u   • Heart murmur, chronic per patient   • Need for screening mammogram   • Left hip pain chronic x 2-3 years   • Asthma   • Hypertension   • Hyperuricemia   • Hypercalcemia   • Increased PTH level       Medical History         Past Medical History:   Diagnosis Date   • Asthma     • Bilateral bunions     • Colon polyp     • GERD (gastroesophageal reflux disease)     • Goiter       NODULAR THYROID   • Heart murmur     • Hypercalcemia     • Hyperlipidemia     • Hyperparathyroidism     • Hypertension     • Left hip pain     • Parathyroid tumor     • PONV (postoperative nausea and vomiting)     • Seasonal allergies     • Type 2 diabetes mellitus     • Vitamin D deficiency            Surgical History          Past Surgical History:   Procedure Laterality Date   • APPENDECTOMY N/A 1978   • CATARACT EXTRACTION Bilateral 2016   • CATARACT EXTRACTION WITH INTRAOCULAR LENS IMPLANT Bilateral     • COLONOSCOPY W/ POLYPECTOMY N/A 2011     Dr. Turcios   • PARATHYROIDECTOMY         right side   • SINUS SURGERY         x 3   • THYROIDECTOMY Right 1/19/2017     Procedure: RIGHT SUPERIOR PARATHYROIDECTOMY;  Surgeon: Maria C Silverio MD;  [place] [facility] [place] [facility] [place]  Service:    • TONSILLECTOMY         still has part of a tonsil there   • TOTAL ABDOMINAL HYSTERECTOMY                    Family History   Problem Relation Age of Onset   • Cancer Mother         ovarian   • Heart disease Mother     • Heart disease  Father     • Colon polyps Father         Social History    Social History            Social History   • Marital status: Unknown       Spouse name: N/A   • Number of children: N/A   • Years of education: N/A          Occupational History   • Not on file.              Social History Main Topics   • Smoking status: Former Smoker       Packs/day: 1.00       Years: 40.00       Types: Cigarettes       Quit date: 2002   • Smokeless tobacco: Not on file   • Alcohol use Yes          Comment: 2 glasses of wine on the holidays   • Drug use: No   • Sexual activity: Defer           Other Topics Concern   • Not on file      Social History Narrative            Current Outpatient Prescriptions:   •  ADVAIR DISKUS 500-50 MCG/DOSE DISKUS, Inhale 1 puff 2 (Two) Times a Day., Disp: , Rfl:   •  allopurinol (ZYLOPRIM) 100 MG tablet, Take 1 tablet by mouth Daily., Disp: 30 tablet, Rfl: 2  •  amLODIPine (NORVASC) 5 MG tablet, Take 5 mg by mouth Daily., Disp: , Rfl:   •  BENICAR 40 MG tablet, Take 40 mg by mouth Daily., Disp: , Rfl:   •  Blood Glucose Monitoring Suppl (ACCU-CHEK SONIA PLUS) W/DEVICE kit, , Disp: , Rfl:   •  cetirizine (zyrTEC) 10 MG tablet, Take 10 mg by mouth Daily As Needed., Disp: , Rfl:   •  fexofenadine (ALLEGRA) 180 MG tablet, Take 180 mg by mouth Daily As Needed., Disp: , Rfl:   •  fluticasone (FLONASE) 50 MCG/ACT nasal spray, 2 sprays into each nostril Daily., Disp: , Rfl:   •  GuaiFENesin ER (MUCINEX MAXIMUM STRENGTH) 1200 MG tablet sustained-release 12 hour, Take 1 tablet by mouth As Needed., Disp: , Rfl:   •  INVOKANA 300 MG tablet, Take 300 mg by mouth Daily., Disp: 30 tablet, Rfl: 5  •  JENTADUETO XR 2.5-1000 MG tablet sustained-release 24 hour, Take 2 tablets by mouth Daily., Disp: 60 tablet, Rfl: 5  •  pravastatin (PRAVACHOL) 80 MG tablet, Take 1 tablet by mouth Every Night., Disp: 30 tablet, Rfl: 5  •  QUEtiapine (SEROQUEL) 50 MG tablet, Take 1 tablet by mouth Every Night. (Patient taking differently: Take  50 mg by mouth As Needed.), Disp: 30 tablet, Rfl: 11  •  VENTOLIN  (90 BASE) MCG/ACT inhaler, Inhale 2 puffs Every 6 (Six) Hours As Needed., Disp: , Rfl:   •  vitamin D (ERGOCALCIFEROL) 17581 UNITS capsule capsule, Take 1 capsule by mouth 1 (One) Time Per Week. SUNDAYS, Disp: 13 capsule, Rfl: 3  •  zolpidem (AMBIEN) 10 MG tablet, Take 10 mg by mouth At Night As Needed., Disp: , Rfl:   No Known Allergies        Objective           Vitals:     08/16/17 0934   BP: 139/91   Pulse: 90   Resp: 16   Temp: 97.5 °F (36.4 °C)   SpO2: 99%      Physical Exam   Constitutional: She is oriented to person, place, and time. Vital signs are normal. She appears well-developed and well-nourished. No distress.   HENT:   Head: Normocephalic and atraumatic.   Eyes: EOM are normal. Pupils are equal, round, and reactive to light.   Neck: Normal range of motion. Neck supple. No JVD present. No tracheal deviation present.   Cardiovascular: Normal rate, regular rhythm, normal heart sounds and intact distal pulses.    No murmur heard.  Pulmonary/Chest: Effort normal and breath sounds normal. She has no wheezes. She has no rhonchi. She has no rales. She exhibits no tenderness.   Abdominal: Soft. There is no tenderness.   Musculoskeletal: Normal range of motion. She exhibits no edema or tenderness.   Lymphadenopathy:     She has no cervical adenopathy.   Neurological: She is alert and oriented to person, place, and time. She has normal strength.   Skin: Skin is warm and dry. No rash noted. No cyanosis or erythema.   Psychiatric: She has a normal mood and affect. Her behavior is normal.      Independent Review of Radiographic Studies:    CT Chest With Contrast [[id number]] (Order [id number])   Status: Final result   Study Notes      Cristopher Lewis on 8/9/2017 10:06 AM   Follow up pulmonary nodule seen on imaging done at [facility]  Obtaining imaging       Appointment Information   PACS Images   Radiology Images   Study Result   ?CT  CHEST WITH IV CONTRAST      HISTORY: 67-year-old female with history of a parathyroid adenoma.  Pulmonary nodule and enlarged mediastinal node.      TECHNIQUE: 3 mm images were obtained through the chest after the  administration of IV contrast. Compared with an outside facility nuclear  parathyroid scan with SPECT/CT imaging from 07/26/2017.      FINDINGS: There is no significant interval change in the irregular 1 cm  pulmonary nodule in the perihilar region of the left upper lobe. There  is also no change in size of the 2.3 x 2.0 cm AP window node. Shotty  mediastinal and hilar nodes appear stable. There are no new pulmonary  opacities and there are no pleural or pericardial effusions. The  pulmonary arteries are enlarged measuring 3.4 cm transversely consistent  with pulmonary arterial hypertension.      IMPRESSION:  Stable irregular 1 cm pulmonary nodule in the perihilar  region of the left upper lobe and the 2.3 x 2.0 cm AP window node is  stable as well. Further characterization with PET/CT should be  considered or short-term follow-up is recommended with chest CT in 3  months.      This report was finalized on 8/11/2017 4:45 PM by Dr. Angeli Cullen MD.                      Assessment/Plan      Assessment:  Left upper lobe pulmonary nodule with concomitant aortopulmonary window mediastinal lymphadenopathy.  Prior history of tobacco use stopped in 2002.  Intermittent productive cough secondary to sinus drainage.  History of recent parathyroidectomy for benign parathyroid adenoma.     Plan:  Our plan at this time is to proceed with a navigational bronchoscopy with the assistance of our pulmonary colleagues.  If this is nondiagnostic our next steps would be a left video-assisted thoracoscopy and lymph node biopsies.  Our concern is that this nodule may represent a malignant process such as a primary bronchogenic carcinoma or possibly lymphoma.  Nonmalignant processes such as sarcoidosis and histoplasmosis cannot be  excluded at this time however they are lower on her differential diagnosis.  The patient is a former smoker and she stopped in 2002 making these most recent findings more concerning.  I do not believe that these findings are related to her parathyroid disease.  Once her bronchoscopy has been completed she will follow-up in our office to discuss the results and any plans moving forward.Should you have any questions or concerns regarding your patient's care, please do not hesitate to contact me.  Sincerely, Rupert Wright M.D.  There are no diagnoses linked to this encounter.

## 2017-08-17 NOTE — PATIENT INSTRUCTIONS
Pt seen by Dr. Wright and was referred to Dr. Bryant for a navigational bronch. Pt is scheduled for navigational bronchoscopy on 09-07-17 and will follow up in lung clinic. Pt given contact cards for Dr. Wright and nurse navigator.

## 2017-09-05 ENCOUNTER — HOSPITAL ENCOUNTER (OUTPATIENT)
Dept: CT IMAGING | Facility: HOSPITAL | Age: 67
Discharge: HOME | End: 2017-09-05
Attending: INTERNAL MEDICINE | Admitting: INTERNAL MEDICINE

## 2017-09-05 DIAGNOSIS — R91.1 PULMONARY NODULE: ICD-10-CM

## 2017-09-05 PROCEDURE — 71250 CT THORAX DX C-: CPT

## 2017-09-07 ENCOUNTER — ANESTHESIA (OUTPATIENT)
Dept: GASTROENTEROLOGY | Facility: HOSPITAL | Age: 67
End: 2017-09-07

## 2017-09-07 ENCOUNTER — HOSPITAL ENCOUNTER (OUTPATIENT)
Facility: HOSPITAL | Age: 67
Setting detail: HOSPITAL OUTPATIENT SURGERY
Discharge: HOME | End: 2017-09-07
Attending: INTERNAL MEDICINE | Admitting: INTERNAL MEDICINE

## 2017-09-07 ENCOUNTER — APPOINTMENT (OUTPATIENT)
Dept: GENERAL RADIOLOGY | Facility: HOSPITAL | Age: 67
End: 2017-09-07

## 2017-09-07 ENCOUNTER — ANESTHESIA EVENT (OUTPATIENT)
Dept: GASTROENTEROLOGY | Facility: HOSPITAL | Age: 67
End: 2017-09-07

## 2017-09-07 VITALS
HEIGHT: 66 IN | TEMPERATURE: 97.8 F | RESPIRATION RATE: 18 BRPM | OXYGEN SATURATION: 95 % | WEIGHT: 165.9 LBS | DIASTOLIC BLOOD PRESSURE: 71 MMHG | BODY MASS INDEX: 26.66 KG/M2 | SYSTOLIC BLOOD PRESSURE: 101 MMHG | HEART RATE: 91 BPM

## 2017-09-07 DIAGNOSIS — R91.8 PULMONARY NODULES: ICD-10-CM

## 2017-09-07 LAB — GLUCOSE BLDC GLUCOMTR-MCNC: 124 MG/DL (ref 70–130)

## 2017-09-07 PROCEDURE — 71010 HC CHEST PA OR AP: CPT

## 2017-09-07 PROCEDURE — 87205 SMEAR GRAM STAIN: CPT | Performed by: INTERNAL MEDICINE

## 2017-09-07 PROCEDURE — 94640 AIRWAY INHALATION TREATMENT: CPT

## 2017-09-07 PROCEDURE — 87206 SMEAR FLUORESCENT/ACID STAI: CPT | Performed by: INTERNAL MEDICINE

## 2017-09-07 PROCEDURE — 94799 UNLISTED PULMONARY SVC/PX: CPT

## 2017-09-07 PROCEDURE — 87102 FUNGUS ISOLATION CULTURE: CPT | Performed by: INTERNAL MEDICINE

## 2017-09-07 PROCEDURE — 25010000002 PROPOFOL 10 MG/ML EMULSION: Performed by: ANESTHESIOLOGY

## 2017-09-07 PROCEDURE — 88104 CYTOPATH FL NONGYN SMEARS: CPT | Performed by: INTERNAL MEDICINE

## 2017-09-07 PROCEDURE — 76000 FLUOROSCOPY <1 HR PHYS/QHP: CPT

## 2017-09-07 PROCEDURE — 82962 GLUCOSE BLOOD TEST: CPT

## 2017-09-07 PROCEDURE — 87071 CULTURE AEROBIC QUANT OTHER: CPT | Performed by: INTERNAL MEDICINE

## 2017-09-07 PROCEDURE — 88305 TISSUE EXAM BY PATHOLOGIST: CPT | Performed by: INTERNAL MEDICINE

## 2017-09-07 PROCEDURE — 87116 MYCOBACTERIA CULTURE: CPT | Performed by: INTERNAL MEDICINE

## 2017-09-07 PROCEDURE — 88112 CYTOPATH CELL ENHANCE TECH: CPT | Performed by: INTERNAL MEDICINE

## 2017-09-07 RX ORDER — PROPOFOL 10 MG/ML
VIAL (ML) INTRAVENOUS CONTINUOUS PRN
Status: DISCONTINUED | OUTPATIENT
Start: 2017-09-07 | End: 2017-09-07 | Stop reason: SURG

## 2017-09-07 RX ORDER — LIDOCAINE HYDROCHLORIDE 20 MG/ML
INJECTION, SOLUTION EPIDURAL; INFILTRATION; INTRACAUDAL; PERINEURAL AS NEEDED
Status: DISCONTINUED | OUTPATIENT
Start: 2017-09-07 | End: 2017-09-07 | Stop reason: HOSPADM

## 2017-09-07 RX ORDER — SODIUM CHLORIDE, SODIUM LACTATE, POTASSIUM CHLORIDE, CALCIUM CHLORIDE 600; 310; 30; 20 MG/100ML; MG/100ML; MG/100ML; MG/100ML
30 INJECTION, SOLUTION INTRAVENOUS CONTINUOUS PRN
Status: DISCONTINUED | OUTPATIENT
Start: 2017-09-07 | End: 2017-09-07 | Stop reason: HOSPADM

## 2017-09-07 RX ORDER — PROPOFOL 10 MG/ML
VIAL (ML) INTRAVENOUS AS NEEDED
Status: DISCONTINUED | OUTPATIENT
Start: 2017-09-07 | End: 2017-09-07 | Stop reason: SURG

## 2017-09-07 RX ORDER — IPRATROPIUM BROMIDE AND ALBUTEROL SULFATE 2.5; .5 MG/3ML; MG/3ML
3 SOLUTION RESPIRATORY (INHALATION)
Status: DISCONTINUED | OUTPATIENT
Start: 2017-09-07 | End: 2017-09-07 | Stop reason: HOSPADM

## 2017-09-07 RX ORDER — LIDOCAINE HYDROCHLORIDE 20 MG/ML
INJECTION, SOLUTION INFILTRATION; PERINEURAL AS NEEDED
Status: DISCONTINUED | OUTPATIENT
Start: 2017-09-07 | End: 2017-09-07 | Stop reason: SURG

## 2017-09-07 RX ADMIN — PROPOFOL 100 MCG/KG/MIN: 10 INJECTION, EMULSION INTRAVENOUS at 11:25

## 2017-09-07 RX ADMIN — PROPOFOL 100 MG: 10 INJECTION, EMULSION INTRAVENOUS at 11:25

## 2017-09-07 RX ADMIN — PROPOFOL 100 MG: 10 INJECTION, EMULSION INTRAVENOUS at 11:30

## 2017-09-07 RX ADMIN — IPRATROPIUM BROMIDE AND ALBUTEROL SULFATE 3 ML: .5; 3 SOLUTION RESPIRATORY (INHALATION) at 12:27

## 2017-09-07 RX ADMIN — SODIUM CHLORIDE, POTASSIUM CHLORIDE, SODIUM LACTATE AND CALCIUM CHLORIDE: 600; 310; 30; 20 INJECTION, SOLUTION INTRAVENOUS at 11:20

## 2017-09-07 RX ADMIN — LIDOCAINE HYDROCHLORIDE 60 MG: 20 INJECTION, SOLUTION INFILTRATION; PERINEURAL at 11:25

## 2017-09-07 RX ADMIN — SODIUM CHLORIDE, POTASSIUM CHLORIDE, SODIUM LACTATE AND CALCIUM CHLORIDE 30 ML/HR: 600; 310; 30; 20 INJECTION, SOLUTION INTRAVENOUS at 10:58

## 2017-09-07 RX ADMIN — PROPOFOL 100 MG: 10 INJECTION, EMULSION INTRAVENOUS at 11:35

## 2017-09-07 NOTE — ANESTHESIA POSTPROCEDURE EVALUATION
Patient: Roger Siegel    Procedure Summary     Date Anesthesia Start Anesthesia Stop Room / Location    09/07/17 1124 1209  [facility]       Procedure Diagnosis Surgeon Provider    BRONCHOSCOPY WITH BAL, BRUSHINGS AND BIOPSY WITH FLUORO (N/A Bronchus) No diagnosis on file. MD Tate Hodgson MD          Anesthesia Type: MAC  Last vitals  BP   101/71 (09/07/17 1230)    Temp   36.6 °C (97.8 °F) (09/07/17 1209)    Pulse   91 (09/07/17 1230)   Resp   18 (09/07/17 1230)    SpO2   95 % (09/07/17 1230)      Post Anesthesia Care and Evaluation    Patient location during evaluation: PACU  Patient participation: complete - patient participated  Level of consciousness: awake and alert  Pain score: 0  Pain management: adequate  Airway patency: patent  Anesthetic complications: No anesthetic complications    Cardiovascular status: acceptable  Respiratory status: acceptable  Hydration status: acceptable

## 2017-09-07 NOTE — DISCHARGE INSTRUCTIONS
For the next 24 hours patient needs to be with a responsible adult.    For 24 hours DO NOT drive, operate machinery, appliances, drink alcohol, make important decisions or sign legal documents.    After 1:30 pm you start with a light or bland diet and advance to regular diet as tolerated.    Follow recommendations on procedure report provided by your doctor.    Call Dr Bryant for problems [phone]    Problems may include but not limited to: large amounts of bleeding, trouble breathing, repeated vomiting, severe unrelieved pain, fever or chills.

## 2017-09-07 NOTE — OP NOTE
Bronchoscopy Procedure Note    Procedure:  1. Bronchoscopy, Diagnostic    Pre-Operative Diagnosis:  Lung nodule    Post-Operative Diagnosis: Same    Indication:  Lung nodule    Anesthesia: Monitored Anesthesia Care (MAC)    Procedure Details: Patient was consented for the procedure with all risk and benefit of the procedure explained in detail.  Patient was given the opportunity to ask questions and all concerns were answered.  The bronchocope was inserted into the main airway via the LMA. An anatomical survey was done of the main airways and the subsegmental bronchus to at least the first subsegmental level of all five lobes of both lungs.  The findings are reported below.      Findings:  Bronchoscope passed through LMA to the level of the vocal cords.  Lidocaine used for local anesthetic over vocal cords.  Bronchoscope was passed between the vocal cords into the trachea.  All airways were visualized to at least the first subsegment level of all 5 lobes of both lungs.  Airways were of normal size and caliber.  No endobronchial lesions seen.  Moderate thick secretions noted from left upper lobe with mucous plugging noted LB3 requiring aggressive suctioning for successful removal.  Edevate cancer brushings performed and sent for analysis.  Fluoroscopically guided transbronchial brushings in left upper lobe x 4.  Fluoroscopically guided transbronchial biopsies x 6 in the left upper lobe.  Bronchial alveolar lavage performed in the left upper lobe with 120cc saline instilled and 47cc hazy return with plugging noted in sample.    Intended to do navigational bronchoscopy but radiology did not process the images with Super Dimension protocol so unable to use navigational software during this procedure.    Estimated Blood Loss:  Minimal           Specimens:  As above                Complications:  None; patient tolerated the procedure well.           Disposition: PACU - hemodynamically stable.      Patient  tolerated the procedure well.    Jerman Bryant MD  9/7/2017  11:51 AM

## 2017-09-07 NOTE — ANESTHESIA PREPROCEDURE EVALUATION
Anesthesia Evaluation     Patient summary reviewed and Nursing notes reviewed   history of anesthetic complications:         Airway   Mallampati: I  TM distance: <3 FB  Neck ROM: full  no difficulty expected  Dental - normal exam     Pulmonary - normal exam   (+) asthma,   Cardiovascular - normal exam    (+) hypertension, valvular problems/murmurs, hyperlipidemia      Neuro/Psych- negative ROS  GI/Hepatic/Renal/Endo    (+)  GERD, diabetes mellitus,     Musculoskeletal (-) negative ROS    Abdominal  - normal exam    Bowel sounds: normal.   Substance History - negative use     OB/GYN negative ob/gyn ROS         Other                                        Anesthesia Plan    ASA 3     MAC     Anesthetic plan and risks discussed with patient.

## 2017-09-07 NOTE — PLAN OF CARE
Problem: Patient Care Overview (Adult)  Goal: Plan of Care Review  Outcome: Ongoing (interventions implemented as appropriate)    09/07/17 1036   Coping/Psychosocial Response Interventions   Plan Of Care Reviewed With patient   Patient Care Overview   Progress progress toward functional goals as expected       Goal: Adult Individualization and Mutuality  Outcome: Ongoing (interventions implemented as appropriate)    09/07/17 1036   Individualization   Patient Specific Preferences NONE       Goal: Discharge Needs Assessment  Outcome: Ongoing (interventions implemented as appropriate)    09/07/17 1036   Discharge Needs Assessment   Concerns To Be Addressed no discharge needs identified   Discharge Disposition home or self-care   Living Environment   Transportation Available car;family or friend will provide         Problem: Bronchoscopy (Adult)  Goal: Signs and Symptoms of Listed Potential Problems Will be Absent or Manageable (Bronchoscopy)  Outcome: Ongoing (interventions implemented as appropriate)    09/07/17 1036   Bronchoscopy   Problems Assessed (Bronchoscopy) all   Problems Present (Bronchoscopy) none

## 2017-09-08 LAB
CYTO UR: NORMAL
CYTO UR: NORMAL
GIE STN SPEC: NORMAL
GIE STN SPEC: NORMAL
LAB AP CASE REPORT: NORMAL
LAB AP CASE REPORT: NORMAL
Lab: NORMAL
Lab: NORMAL
NIGHT BLUE STAIN TISS: NORMAL
PATH REPORT.FINAL DX SPEC: NORMAL
PATH REPORT.FINAL DX SPEC: NORMAL
PATH REPORT.GROSS SPEC: NORMAL
PATH REPORT.GROSS SPEC: NORMAL

## 2017-09-09 LAB
BACTERIA SPEC AEROBE CULT: NORMAL
GRAM STN SPEC: NORMAL
GRAM STN SPEC: NORMAL

## 2017-09-21 ENCOUNTER — TRANSCRIBE ORDERS (OUTPATIENT)
Dept: ADMINISTRATIVE | Facility: HOSPITAL | Age: 67
End: 2017-09-21

## 2017-09-21 DIAGNOSIS — R91.1 PULMONARY NODULE: Primary | ICD-10-CM

## 2017-09-29 ENCOUNTER — DOCUMENTATION (OUTPATIENT)
Dept: OTHER | Facility: HOSPITAL | Age: 67
End: 2017-09-29

## 2017-09-29 NOTE — PROGRESS NOTES
Spoke with pt regarding follow up appointment with Dr. Wright. She stated she will have a CT chest in 3 mos ordered by Dr. Bryant and did not think follow up with Dr. Wright was at this time. I informed her to call nurse navigator if she had any questions or concerns.

## 2017-10-05 LAB — FUNGUS WND CULT: NORMAL

## 2017-10-19 LAB
MYCOBACTERIUM SPEC CULT: NORMAL
NIGHT BLUE STAIN TISS: NORMAL

## 2017-12-13 ENCOUNTER — HOSPITAL ENCOUNTER (OUTPATIENT)
Dept: CT IMAGING | Facility: HOSPITAL | Age: 67
Discharge: HOME | End: 2017-12-13
Attending: INTERNAL MEDICINE | Admitting: INTERNAL MEDICINE

## 2017-12-13 DIAGNOSIS — R91.1 PULMONARY NODULE: ICD-10-CM

## 2017-12-13 LAB — CREAT BLDA-MCNC: 0.8 MG/DL (ref 0.6–1.3)

## 2017-12-13 PROCEDURE — 71260 CT THORAX DX C+: CPT

## 2017-12-13 PROCEDURE — 0 IOPAMIDOL PER 1 ML: Performed by: INTERNAL MEDICINE

## 2017-12-13 PROCEDURE — 82565 ASSAY OF CREATININE: CPT

## 2017-12-13 RX ADMIN — IOPAMIDOL 75 ML: 755 INJECTION, SOLUTION INTRAVENOUS at 09:25

## 2017-12-21 ENCOUNTER — TRANSCRIBE ORDERS (OUTPATIENT)
Dept: ADMINISTRATIVE | Facility: HOSPITAL | Age: 67
End: 2017-12-21

## 2017-12-21 DIAGNOSIS — R91.8 PULMONARY NODULES: Primary | ICD-10-CM

## 2018-01-19 DIAGNOSIS — E83.52 HYPERCALCEMIA: Primary | ICD-10-CM

## 2018-01-19 DIAGNOSIS — E78.49 OTHER HYPERLIPIDEMIA: ICD-10-CM

## 2018-01-19 DIAGNOSIS — E79.0 HYPERURICEMIA: ICD-10-CM

## 2018-01-19 DIAGNOSIS — E11.8 TYPE 2 DIABETES MELLITUS WITH COMPLICATION, WITHOUT LONG-TERM CURRENT USE OF INSULIN (HCC): ICD-10-CM

## 2018-01-19 DIAGNOSIS — E04.2 NONTOXIC MULTINODULAR GOITER: ICD-10-CM

## 2018-01-31 ENCOUNTER — LAB (OUTPATIENT)
Dept: ENDOCRINOLOGY | Age: 68
End: 2018-01-31

## 2018-01-31 DIAGNOSIS — E79.0 HYPERURICEMIA: ICD-10-CM

## 2018-01-31 DIAGNOSIS — E78.49 OTHER HYPERLIPIDEMIA: ICD-10-CM

## 2018-01-31 DIAGNOSIS — E11.8 TYPE 2 DIABETES MELLITUS WITH COMPLICATION, WITHOUT LONG-TERM CURRENT USE OF INSULIN (HCC): ICD-10-CM

## 2018-01-31 DIAGNOSIS — E04.2 NONTOXIC MULTINODULAR GOITER: ICD-10-CM

## 2018-01-31 DIAGNOSIS — I10 BENIGN ESSENTIAL HTN: ICD-10-CM

## 2018-01-31 DIAGNOSIS — E11.9 CONTROLLED TYPE 2 DIABETES MELLITUS WITHOUT COMPLICATION, UNSPECIFIED LONG TERM INSULIN USE STATUS: ICD-10-CM

## 2018-01-31 DIAGNOSIS — E83.52 HYPERCALCEMIA: ICD-10-CM

## 2018-01-31 DIAGNOSIS — Z98.890 HISTORY OF PARATHYROID SURGERY: ICD-10-CM

## 2018-02-04 LAB
25(OH)D3+25(OH)D2 SERPL-MCNC: 59.7 NG/ML (ref 30–100)
ALBUMIN SERPL-MCNC: 4.8 G/DL (ref 3.5–5.2)
ALBUMIN/GLOB SERPL: 1.3 G/DL
ALP SERPL-CCNC: 79 U/L (ref 39–117)
ALT SERPL-CCNC: 8 U/L (ref 1–33)
AST SERPL-CCNC: 13 U/L (ref 1–32)
BILIRUB SERPL-MCNC: 0.5 MG/DL (ref 0.1–1.2)
BUN SERPL-MCNC: 20 MG/DL (ref 8–23)
BUN/CREAT SERPL: 21.1 (ref 7–25)
C PEPTIDE SERPL-MCNC: 2.8 NG/ML (ref 1.1–4.4)
CA-I SERPL ISE-MCNC: 5.7 MG/DL (ref 4.5–5.6)
CALCIUM SERPL-MCNC: 10.7 MG/DL (ref 8.6–10.5)
CHLORIDE SERPL-SCNC: 101 MMOL/L (ref 98–107)
CHOLEST SERPL-MCNC: 294 MG/DL (ref 0–200)
CO2 SERPL-SCNC: 27.4 MMOL/L (ref 22–29)
CREAT SERPL-MCNC: 0.95 MG/DL (ref 0.57–1)
GFR SERPLBLD CREATININE-BSD FMLA CKD-EPI: 59 ML/MIN/1.73
GFR SERPLBLD CREATININE-BSD FMLA CKD-EPI: 71 ML/MIN/1.73
GLOBULIN SER CALC-MCNC: 3.7 GM/DL
GLUCOSE SERPL-MCNC: 130 MG/DL (ref 65–99)
HBA1C MFR BLD: 7.9 % (ref 4.8–5.6)
HDLC SERPL-MCNC: 38 MG/DL (ref 40–60)
INTERPRETATION: NORMAL
LDLC SERPL CALC-MCNC: 234 MG/DL (ref 0–100)
Lab: NORMAL
MICROALBUMIN UR-MCNC: 20.3 UG/ML
PHOSPHATE SERPL-MCNC: 4 MG/DL (ref 2.5–4.5)
POTASSIUM SERPL-SCNC: 4.3 MMOL/L (ref 3.5–5.2)
PROT SERPL-MCNC: 8.5 G/DL (ref 6–8.5)
PTH-INTACT SERPL-MCNC: 42 PG/ML (ref 15–65)
SODIUM SERPL-SCNC: 142 MMOL/L (ref 136–145)
T3FREE SERPL-MCNC: 2.6 PG/ML (ref 2–4.4)
T4 FREE SERPL-MCNC: 1.23 NG/DL (ref 0.93–1.7)
T4 SERPL-MCNC: 6.67 MCG/DL (ref 4.5–11.7)
THYROGLOB AB SERPL-ACNC: <1 IU/ML
THYROGLOB SERPL-MCNC: 4.4 NG/ML
THYROGLOB SERPL-MCNC: NORMAL NG/ML
TRIGL SERPL-MCNC: 112 MG/DL (ref 0–150)
TSH SERPL DL<=0.005 MIU/L-ACNC: 1.77 MIU/ML (ref 0.27–4.2)
URATE SERPL-MCNC: 5.9 MG/DL (ref 2.4–5.7)
VLDLC SERPL CALC-MCNC: 22.4 MG/DL (ref 5–40)

## 2018-02-14 ENCOUNTER — OFFICE VISIT (OUTPATIENT)
Dept: ENDOCRINOLOGY | Age: 68
End: 2018-02-14

## 2018-02-14 VITALS
DIASTOLIC BLOOD PRESSURE: 84 MMHG | BODY MASS INDEX: 26.36 KG/M2 | SYSTOLIC BLOOD PRESSURE: 120 MMHG | HEIGHT: 66 IN | WEIGHT: 164 LBS | RESPIRATION RATE: 16 BRPM

## 2018-02-14 DIAGNOSIS — E04.2 MULTIPLE THYROID NODULES: ICD-10-CM

## 2018-02-14 DIAGNOSIS — E04.2 NONTOXIC MULTINODULAR GOITER: ICD-10-CM

## 2018-02-14 DIAGNOSIS — E34.9 INCREASED PTH LEVEL: ICD-10-CM

## 2018-02-14 DIAGNOSIS — I10 BENIGN ESSENTIAL HTN: ICD-10-CM

## 2018-02-14 DIAGNOSIS — E83.52 HYPERCALCEMIA: ICD-10-CM

## 2018-02-14 DIAGNOSIS — E11.8 TYPE 2 DIABETES MELLITUS WITH COMPLICATION, WITHOUT LONG-TERM CURRENT USE OF INSULIN (HCC): Primary | ICD-10-CM

## 2018-02-14 DIAGNOSIS — E79.0 HYPERURICEMIA: ICD-10-CM

## 2018-02-14 DIAGNOSIS — E78.2 MIXED HYPERLIPIDEMIA: ICD-10-CM

## 2018-02-14 PROCEDURE — 99214 OFFICE O/P EST MOD 30 MIN: CPT | Performed by: INTERNAL MEDICINE

## 2018-02-14 RX ORDER — CANAGLIFLOZIN 300 MG/1
300 TABLET, FILM COATED ORAL DAILY
Qty: 30 TABLET | Refills: 5 | Status: SHIPPED | OUTPATIENT
Start: 2018-02-14 | End: 2018-08-16 | Stop reason: SDUPTHER

## 2018-02-14 RX ORDER — ERGOCALCIFEROL 1.25 MG/1
50000 CAPSULE ORAL WEEKLY
Qty: 13 CAPSULE | Refills: 3 | Status: SHIPPED | OUTPATIENT
Start: 2018-02-14 | End: 2019-02-04 | Stop reason: SDUPTHER

## 2018-02-14 RX ORDER — ROSUVASTATIN CALCIUM 40 MG/1
40 TABLET, COATED ORAL DAILY
Qty: 30 TABLET | Refills: 5 | Status: SHIPPED | OUTPATIENT
Start: 2018-02-14 | End: 2019-02-04 | Stop reason: SDUPTHER

## 2018-02-14 RX ORDER — QUETIAPINE FUMARATE 50 MG/1
50 TABLET, FILM COATED ORAL NIGHTLY
Qty: 30 TABLET | Refills: 11 | Status: SHIPPED | OUTPATIENT
Start: 2018-02-14 | End: 2019-10-14 | Stop reason: SDUPTHER

## 2018-02-14 RX ORDER — LINAGLIPTIN AND METFORMIN HYDROCHLORIDE 2.5; 1 MG/1; MG/1
2 TABLET, FILM COATED, EXTENDED RELEASE ORAL DAILY
Qty: 60 TABLET | Refills: 5 | Status: SHIPPED | OUTPATIENT
Start: 2018-02-14 | End: 2018-08-16 | Stop reason: SDUPTHER

## 2018-02-14 NOTE — PROGRESS NOTES
"Subjective   Roger Siegel is a 67 y.o. female seen for follow up for DM2, hyperlipidemia, hyperparathyroidism, goiter, vit d deficiency, lab review. Patient is not checking BG. She is only taking Seroquel as needed. She is requesting a cheaper generic statin for cholesterol. She did not start Allopurinol. Parathyroid tumor removed 01/2017 but she states that she now has another one based on CT scan from 12/2017.     History of Present Illness this is a 67-year-old female known patient with type II diabetes and hypertension and dyslipidemia with vitamin D deficiency and multinodular goiter with hypercalcemia as well as hyperparathyroidism.  Over the course of last 6 months she has had no significant health problems for which to go to the emergency room or hospital however she had Parathyroidectomy on January 2017 and still is hyper calcemic.  Part of the reason for not following through with her hypercalcemia is dead distraction with pulmonary nodule which seems to be benign.    /84  Resp 16  Ht 167.6 cm (66\")  Wt 74.4 kg (164 lb)  BMI 26.47 kg/m2     No Known Allergies    Current Outpatient Prescriptions:   •  ADVAIR DISKUS 500-50 MCG/DOSE DISKUS, Inhale 1 puff 2 (Two) Times a Day., Disp: , Rfl:   •  amLODIPine (NORVASC) 5 MG tablet, Take 5 mg by mouth Daily., Disp: , Rfl:   •  BENICAR 40 MG tablet, Take 40 mg by mouth Daily., Disp: , Rfl:   •  Blood Glucose Monitoring Suppl (ACCU-CHEK SONIA PLUS) W/DEVICE kit, , Disp: , Rfl:   •  cetirizine (zyrTEC) 10 MG tablet, Take 10 mg by mouth Daily As Needed., Disp: , Rfl:   •  fluticasone (FLONASE) 50 MCG/ACT nasal spray, 2 sprays into each nostril Daily., Disp: , Rfl:   •  GuaiFENesin ER (MUCINEX MAXIMUM STRENGTH) 1200 MG tablet sustained-release 12 hour, Take 1 tablet by mouth As Needed., Disp: , Rfl:   •  INVOKANA 300 MG tablet, Take 300 mg by mouth Daily., Disp: 30 tablet, Rfl: 5  •  JENTADUETO XR 2.5-1000 MG tablet sustained-release 24 hour, Take 2 " tablets by mouth Daily., Disp: 60 tablet, Rfl: 5  •  QUEtiapine (SEROQUEL) 50 MG tablet, Take 1 tablet by mouth Every Night. (Patient taking differently: Take 50 mg by mouth As Needed.), Disp: 30 tablet, Rfl: 11  •  VENTOLIN  (90 BASE) MCG/ACT inhaler, Inhale 2 puffs Every 6 (Six) Hours As Needed., Disp: , Rfl:   •  vitamin D (ERGOCALCIFEROL) 04551 UNITS capsule capsule, Take 1 capsule by mouth 1 (One) Time Per Week. SUNDAYS, Disp: 13 capsule, Rfl: 3      The following portions of the patient's history were reviewed and updated as appropriate: allergies, current medications, past family history, past medical history, past social history, past surgical history and problem list.    Review of Systems   Constitutional: Negative.    HENT: Negative.    Eyes: Negative.    Respiratory: Negative.    Cardiovascular: Negative.    Gastrointestinal: Negative.    Endocrine: Negative.    Genitourinary: Negative.    Musculoskeletal: Negative.    Skin: Negative.    Allergic/Immunologic: Negative.    Neurological: Negative.    Hematological: Negative.    Psychiatric/Behavioral: Negative.        Objective   Physical Exam   Constitutional: She is oriented to person, place, and time. She appears well-developed and well-nourished. No distress.   HENT:   Head: Normocephalic and atraumatic.   Right Ear: External ear normal.   Left Ear: External ear normal.   Nose: Nose normal.   Mouth/Throat: Oropharynx is clear and moist. No oropharyngeal exudate.   Eyes: Conjunctivae and EOM are normal. Pupils are equal, round, and reactive to light. Right eye exhibits no discharge. Left eye exhibits no discharge. No scleral icterus.   Neck: Trachea normal, normal range of motion and full passive range of motion without pain. Neck supple. No JVD present. No tracheal tenderness present. Carotid bruit is not present. No tracheal deviation, no edema and no erythema present. No thyroid mass and no thyromegaly present.   Healed the scar of  parathyroidectomy in lower neck.   Cardiovascular: Normal rate, regular rhythm, normal heart sounds and intact distal pulses.  Exam reveals no gallop and no friction rub.    No murmur heard.  Pulmonary/Chest: Effort normal and breath sounds normal. No stridor. No respiratory distress. She has no wheezes. She has no rales. She exhibits no tenderness.   Abdominal: Soft. Bowel sounds are normal. She exhibits no distension and no mass. There is no tenderness. There is no rebound and no guarding. No hernia.   Musculoskeletal: Normal range of motion. She exhibits no edema, tenderness or deformity.   Lymphadenopathy:     She has no cervical adenopathy.   Neurological: She is alert and oriented to person, place, and time. She has normal reflexes. She displays normal reflexes. No cranial nerve deficit. She exhibits normal muscle tone. Coordination normal.   Skin: Skin is warm and dry. No rash noted. She is not diaphoretic. No erythema. No pallor.   Psychiatric: She has a normal mood and affect. Her behavior is normal. Judgment and thought content normal.   Nursing note and vitals reviewed.       Results for orders placed or performed in visit on 01/31/18   Comprehensive Metabolic Panel   Result Value Ref Range    Glucose 130 (H) 65 - 99 mg/dL    BUN 20 8 - 23 mg/dL    Creatinine 0.95 0.57 - 1.00 mg/dL    eGFR Non African Am 59 (L) >60 mL/min/1.73    eGFR African Am 71 >60 mL/min/1.73    BUN/Creatinine Ratio 21.1 7.0 - 25.0    Sodium 142 136 - 145 mmol/L    Potassium 4.3 3.5 - 5.2 mmol/L    Chloride 101 98 - 107 mmol/L    Total CO2 27.4 22.0 - 29.0 mmol/L    Calcium 10.7 (H) 8.6 - 10.5 mg/dL    Total Protein 8.5 6.0 - 8.5 g/dL    Albumin 4.80 3.50 - 5.20 g/dL    Globulin 3.7 gm/dL    A/G Ratio 1.3 g/dL    Total Bilirubin 0.5 0.1 - 1.2 mg/dL    Alkaline Phosphatase 79 39 - 117 U/L    AST (SGOT) 13 1 - 32 U/L    ALT (SGPT) 8 1 - 33 U/L   C-Peptide   Result Value Ref Range    C-Peptide 2.8 1.1 - 4.4 ng/mL   Hemoglobin A1c    Result Value Ref Range    Hemoglobin A1C 7.90 (H) 4.80 - 5.60 %   Lipid Panel   Result Value Ref Range    Total Cholesterol 294 (H) 0 - 200 mg/dL    Triglycerides 112 0 - 150 mg/dL    HDL Cholesterol 38 (L) 40 - 60 mg/dL    VLDL Cholesterol 22.4 5 - 40 mg/dL    LDL Cholesterol  234 (H) 0 - 100 mg/dL   Uric Acid   Result Value Ref Range    Uric Acid 5.9 (H) 2.4 - 5.7 mg/dL   Vitamin D 25 Hydroxy   Result Value Ref Range    25 Hydroxy, Vitamin D 59.7 30.0 - 100.0 ng/mL   T4, Free   Result Value Ref Range    Free T4 1.23 0.93 - 1.70 ng/dL   T4 & TSH ([facility])   Result Value Ref Range    TSH 1.770 0.270 - 4.200 mIU/mL    T4, Total 6.67 4.50 - 11.70 mcg/dL   T3, Free   Result Value Ref Range    T3, Free 2.6 2.0 - 4.4 pg/mL   Comprehensive Thyroglobulin   Result Value Ref Range    Thyroglobulin Ab <1.0 IU/mL    Thyroglobulin 4.4 ng/mL    Thyroglobulin (TG-RANJEET) Comment ng/mL   PTH, Intact   Result Value Ref Range    PTH, Intact 42 15 - 65 pg/mL   Phosphorus   Result Value Ref Range    Phosphorus 4.0 2.5 - 4.5 mg/dL   Calcium, Ionized   Result Value Ref Range    Ionized Calcium 5.7 (H) 4.5 - 5.6 mg/dL   MicroAlbumin, Urine, Random   Result Value Ref Range    Microalbumin, Urine 20.3 Not Estab. ug/mL   Cardiovascular Risk Assessment   Result Value Ref Range    Interpretation Note    Diabetes Patient Education   Result Value Ref Range    PDF Image Not applicable          Assessment/Plan   Diagnoses and all orders for this visit:    Type 2 diabetes mellitus with complication, without long-term current use of insulin  -     T3, Free; Future  -     T4 & TSH ([facility]); Future  -     T4, Free; Future  -     Uric Acid; Future  -     Vitamin D 25 Hydroxy; Future  -     Comprehensive Metabolic Panel; Future  -     C-Peptide; Future  -     Hemoglobin A1c; Future  -     Lipid Panel; Future  -     MicroAlbumin, Urine, Random - Urine, Clean Catch; Future  -     Calcium, Ionized; Future  -     Phosphorus; Future  -     PTH, Intact;  Future    Benign essential HTN  -     T3, Free; Future  -     T4 & TSH ([facility]); Future  -     T4, Free; Future  -     Uric Acid; Future  -     Vitamin D 25 Hydroxy; Future  -     Comprehensive Metabolic Panel; Future  -     C-Peptide; Future  -     Hemoglobin A1c; Future  -     Lipid Panel; Future  -     MicroAlbumin, Urine, Random - Urine, Clean Catch; Future  -     Calcium, Ionized; Future  -     Phosphorus; Future  -     PTH, Intact; Future    Mixed hyperlipidemia  -     T3, Free; Future  -     T4 & TSH ([facility]); Future  -     T4, Free; Future  -     Uric Acid; Future  -     Vitamin D 25 Hydroxy; Future  -     Comprehensive Metabolic Panel; Future  -     C-Peptide; Future  -     Hemoglobin A1c; Future  -     Lipid Panel; Future  -     MicroAlbumin, Urine, Random - Urine, Clean Catch; Future  -     Calcium, Ionized; Future  -     Phosphorus; Future  -     PTH, Intact; Future    Nontoxic multinodular goiter  -     T3, Free; Future  -     T4 & TSH ([facility]); Future  -     T4, Free; Future  -     Uric Acid; Future  -     Vitamin D 25 Hydroxy; Future  -     Comprehensive Metabolic Panel; Future  -     C-Peptide; Future  -     Hemoglobin A1c; Future  -     Lipid Panel; Future  -     MicroAlbumin, Urine, Random - Urine, Clean Catch; Future  -     Calcium, Ionized; Future  -     Phosphorus; Future  -     PTH, Intact; Future    2cm right thyroid nodule, 1.5cm left thyroid nodule on US  -     T3, Free; Future  -     T4 & TSH ([facility]); Future  -     T4, Free; Future  -     Uric Acid; Future  -     Vitamin D 25 Hydroxy; Future  -     Comprehensive Metabolic Panel; Future  -     C-Peptide; Future  -     Hemoglobin A1c; Future  -     Lipid Panel; Future  -     MicroAlbumin, Urine, Random - Urine, Clean Catch; Future  -     Calcium, Ionized; Future  -     Phosphorus; Future  -     PTH, Intact; Future    Hyperuricemia  -     T3, Free; Future  -     T4 & TSH ([facility]); Future  -     T4, Free; Future  -     Uric Acid;  Future  -     Vitamin D 25 Hydroxy; Future  -     Comprehensive Metabolic Panel; Future  -     C-Peptide; Future  -     Hemoglobin A1c; Future  -     Lipid Panel; Future  -     MicroAlbumin, Urine, Random - Urine, Clean Catch; Future  -     Calcium, Ionized; Future  -     Phosphorus; Future  -     PTH, Intact; Future    Hypercalcemia  -     Ambulatory Referral to General Surgery  -     T3, Free; Future  -     T4 & TSH ([facility]); Future  -     T4, Free; Future  -     Uric Acid; Future  -     Vitamin D 25 Hydroxy; Future  -     Comprehensive Metabolic Panel; Future  -     C-Peptide; Future  -     Hemoglobin A1c; Future  -     Lipid Panel; Future  -     MicroAlbumin, Urine, Random - Urine, Clean Catch; Future  -     Calcium, Ionized; Future  -     Phosphorus; Future  -     PTH, Intact; Future    Increased PTH level  -     Ambulatory Referral to General Surgery  -     T3, Free; Future  -     T4 & TSH ([facility]); Future  -     T4, Free; Future  -     Uric Acid; Future  -     Vitamin D 25 Hydroxy; Future  -     Comprehensive Metabolic Panel; Future  -     C-Peptide; Future  -     Hemoglobin A1c; Future  -     Lipid Panel; Future  -     MicroAlbumin, Urine, Random - Urine, Clean Catch; Future  -     Calcium, Ionized; Future  -     Phosphorus; Future  -     PTH, Intact; Future    Other orders  -     rosuvastatin (CRESTOR) 40 MG tablet; Take 1 tablet by mouth Daily.  -     JENTADUETO XR 2.5-1000 MG tablet sustained-release 24 hour; Take 2 tablets by mouth Daily.  -     INVOKANA 300 MG tablet; Take 300 mg by mouth Daily.  -     vitamin D (ERGOCALCIFEROL) 37240 units capsule capsule; Take 1 capsule by mouth 1 (One) Time Per Week. SUNDAYS  -     QUEtiapine (SEROQUEL) 50 MG tablet; Take 1 tablet by mouth Every Night.               In summary I saw and examined this 67-year-old female for above-mentioned problems.  I reviewed her laboratory evaluation of 01/31/2018 and provided her with a hard copy of it.  She still remains hyper  calcemic as well as elevated level of hemoglobin A1c at 7.90 and a total cholesterol of 294 with the LDL level of 234 and she said the reason for her cholesterol and LDL being so high is that she feels that she is taking enough medication and she can do without treating her dyslipidemia and after some conversation and she was convinced that she needs to take it I will put her on Crestor 40 mg daily.  Her A1c is little high because because of the cost consideration she has not been taking her medications regularly and all the time.  I also discussed with her the parathyroid scan of 07/28/2017 where there is a left parathyroid adenoma identified and for this I am going to go ahead and refer her to Dr. Maria C Silverio.  She will see Ms. Esther Christensen in 4 months or sooner if needed with laboratory evaluation prior to each office visit.

## 2018-03-14 ENCOUNTER — OFFICE VISIT (OUTPATIENT)
Dept: SURGERY | Facility: CLINIC | Age: 68
End: 2018-03-14

## 2018-03-14 VITALS
OXYGEN SATURATION: 98 % | HEIGHT: 66 IN | HEART RATE: 96 BPM | BODY MASS INDEX: 26.2 KG/M2 | DIASTOLIC BLOOD PRESSURE: 80 MMHG | SYSTOLIC BLOOD PRESSURE: 112 MMHG | WEIGHT: 163 LBS

## 2018-03-14 DIAGNOSIS — E34.9 INCREASED PTH LEVEL: ICD-10-CM

## 2018-03-14 DIAGNOSIS — E83.52 HYPERCALCEMIA: Primary | ICD-10-CM

## 2018-03-14 PROCEDURE — 99214 OFFICE O/P EST MOD 30 MIN: CPT | Performed by: SURGERY

## 2018-03-15 NOTE — PROGRESS NOTES
SURGERY  Vonmillie Siegel   1950  03/15/18    Chief Complaint:  Recurrent hypercalcemia    HPI    Patient is a delightful 67 y.o. female who unfortunately returns, having had a prior parathyroid adenoma resection, that being a right superior, with concordant findings intraoperatively, as well as those from her preoperative SPECT-CT scan, and with her pathology confirmed to have a hypercellular parathyroid.  In addition, her PTH level has dropped, from her prior level of 103, to now 42.  Her ionized calcium however has gone back up to 5.7, with a calcium of 10.7.  Her kidney function is essentially normal with a creatinine of 0.95.    She underwent a repeat SPECT-CT scan, last summer, where she was found to have a 6 mm mass, located on the left mid thyroid, posteriorly, that is avid consistent with a parathyroid adenoma.  Previously seen right superior parathyroid adenoma is now missing.    I have clarified for her today, that the scans were consistent with the surgeries done previously, and the scan now shows that the previously active gland was removed, but that she has a new one.    On the SPECT-CT scan, she also had a lung mass, which has required workup, thus accounting for her delay in presenting back to me.  That has now been cleared up    She has no family history of any hypercalcemia or issues of parathyroids.    Past Medical History:   Diagnosis Date   • Arthritis     LEFT HIP   • Asthma    • Bilateral bunions    • Colon polyp 2011   • Diabetes mellitus, type 2    • GERD (gastroesophageal reflux disease)    • Goiter     NODULAR THYROID   • Heart murmur    • History of pneumonia 11/2017   • History of transfusion    • Hypercalcemia    • Hyperlipidemia    • Hyperparathyroidism    • Hypertension    • Left hip pain    • Parathyroid adenoma    • PONV (postoperative nausea and vomiting)    • Seasonal allergies    • Vitamin D deficiency      Past Surgical History:   Procedure Laterality Date   • APPENDECTOMY  N/A 1978   • BRONCHOSCOPY N/A 9/7/2017    Procedure: BRONCHOSCOPY WITH BAL, BRUSHINGS AND BIOPSY WITH FLUORO;  Surgeon: Jerman Bryant MD;  Location: [facility] ENDOSCOPY;  Service:    • CATARACT EXTRACTION Bilateral 2016   • CATARACT EXTRACTION WITH INTRAOCULAR LENS IMPLANT Bilateral    • COLONOSCOPY W/ POLYPECTOMY N/A 2011    Dr. Turcios   • LUNG BIOPSY  2017    done at [facility]   • SINUS SURGERY Bilateral     x 3   • THYROIDECTOMY Right 1/19/2017    Procedure: RIGHT SUPERIOR PARATHYROIDECTOMY;  Surgeon: Maria C Silverio MD;  Location:  [facility];  Service:    • TONSILLECTOMY N/A     still has part of a tonsil   • TOTAL ABDOMINAL HYSTERECTOMY Bilateral 1978     Family History   Problem Relation Age of Onset   • Heart disease Mother    • Ovarian cancer Mother    • Heart disease Father    • Colon polyps Father      Social History     Social History   • Marital status:      Spouse name: Kenny Siegel   • Number of children: 0   • Years of education: N/A     Occupational History   • RN       Social History Main Topics   • Smoking status: Former Smoker     Packs/day: 1.00     Years: 40.00     Types: Cigarettes     Quit date: 2002   • Smokeless tobacco: Never Used   • Alcohol use Yes      Comment: occassionally    • Drug use: No   • Sexual activity: Defer     Other Topics Concern   • Not on file     Social History Narrative   • No narrative on file         Current Outpatient Prescriptions:   •  ADVAIR DISKUS 500-50 MCG/DOSE DISKUS, Inhale 1 puff 2 (Two) Times a Day., Disp: , Rfl:   •  amLODIPine (NORVASC) 5 MG tablet, Take 5 mg by mouth Daily., Disp: , Rfl:   •  BENICAR 40 MG tablet, Take 40 mg by mouth Daily., Disp: , Rfl:   •  Blood Glucose Monitoring Suppl (ACCU-CHEK SONIA PLUS) W/DEVICE kit, , Disp: , Rfl:   •  cetirizine (zyrTEC) 10 MG tablet, Take 10 mg by mouth Daily As Needed., Disp: , Rfl:   •  fluticasone (FLONASE) 50 MCG/ACT nasal spray, 2 sprays into each nostril Daily., Disp: , Rfl:   •  GuaiFENesin  "ER (MUCINEX MAXIMUM STRENGTH) 1200 MG tablet sustained-release 12 hour, Take 1 tablet by mouth As Needed., Disp: , Rfl:   •  INVOKANA 300 MG tablet, Take 300 mg by mouth Daily., Disp: 30 tablet, Rfl: 5  •  JENTADUETO XR 2.5-1000 MG tablet sustained-release 24 hour, Take 2 tablets by mouth Daily., Disp: 60 tablet, Rfl: 5  •  QUEtiapine (SEROQUEL) 50 MG tablet, Take 1 tablet by mouth Every Night., Disp: 30 tablet, Rfl: 11  •  rosuvastatin (CRESTOR) 40 MG tablet, Take 1 tablet by mouth Daily., Disp: 30 tablet, Rfl: 5  •  VENTOLIN  (90 BASE) MCG/ACT inhaler, Inhale 2 puffs Every 6 (Six) Hours As Needed., Disp: , Rfl:   •  vitamin D (ERGOCALCIFEROL) 27287 units capsule capsule, Take 1 capsule by mouth 1 (One) Time Per Week. SUNDAYS, Disp: 13 capsule, Rfl: 3    No Known Allergies    Review of Systems   HENT: Positive for postnasal drip and rhinorrhea.    Allergic/Immunologic: Positive for environmental allergies.   All other systems reviewed and are negative.      Vitals:    03/14/18 1540   BP: 112/80   BP Location: Left arm   Patient Position: Sitting   Pulse: 96   SpO2: 98%   Weight: 73.9 kg (163 lb)   Height: 167.6 cm (66\")       PHYSICAL EXAM:    /80 (BP Location: Left arm, Patient Position: Sitting)   Pulse 96   Ht 167.6 cm (66\")   Wt 73.9 kg (163 lb)   SpO2 98%   BMI 26.31 kg/m²   Body mass index is 26.31 kg/m².    Constitutional: well developed, well nourished, Appears stated age  Eyes: sclera nonicteric, conjunctiva not injected   ENMT: Hearing intact, trachea midline, thyroid without masses, healed incisional scar  CVS: RRR, no murmur, peripheral edema not present  Respiratory: CTA, normal respiratory effort   Gastrointestinal: no hepatosplenomegaly, abdomen soft, nontender  Genitourinary: inguinal hernia not present  Musculoskeletal: gait normal, muscle mass normal  Skin: warm and dry, no rashes visible  Neurological: awake and alert, seems to have reasonable capacity for understanding for " medical decision making  Psychiatric: appears to have reasonable judgement, pleasant  Lymphatics: no cervical adenopathy     Radiographic Findings: As listed above    Lab Findings: As listed above, with normal vitamin D at 59.7    Pamphlet reviewed: Parathyroid    IMPRESSION:  · Recurrent hyperparathyroidism, with prior right superior parathyroid adenoma resected, pathology confirmatory, now with recurrent elevated calcium, normal PTH level  · Lung nodule, preliminary workup negative  · Vitamin D deficiency corrected with supplementation  · Diabetes type 2, with hemoglobin A1c 7.97  · Hypertension, on amlodipine, benicar  · Asthma on advair, cetirizine, ventolin, fluticasone    PLAN:  · 24 hour urine.  I want to make sure that her hypercalcemia is not related to her renal function, prior to considering going back to the OR.  I also want to recheck her intact PTH, calcium, 24 hour urine creatinine, with a creatinine serum, to be absolutely certain on the renal issue.  · She can call for results and we'll go from there, but most likely, we will be returning for a left parathyroid adenoma resection.  She is comfortable with our discussion that she would not need a return office visit, and we could do so via phone.    Maria C Silverio M.D.  3/14/2018      ADDENDUM  04/02/18  24 hour urine has a value of 29, consistent with [facility].  Her calcium/creatinine ratio is 0.0029 which is clearly below 0.01 and thus consistent with [facility].  i'm concerned that her primary problem may be a kidney filtering issue, rather than hyperparathyroidism.  I left her a message that i would like her to get a renal consult.  I put in order.  Will have staff call her to confirm.  Marai C Silverio MD

## 2018-03-26 ENCOUNTER — APPOINTMENT (OUTPATIENT)
Dept: LAB | Facility: HOSPITAL | Age: 68
End: 2018-03-26

## 2018-03-27 ENCOUNTER — LAB (OUTPATIENT)
Dept: LAB | Facility: HOSPITAL | Age: 68
End: 2018-03-27
Attending: SURGERY

## 2018-03-27 DIAGNOSIS — E83.52 HYPERCALCEMIA: ICD-10-CM

## 2018-03-27 DIAGNOSIS — E34.9 INCREASED PTH LEVEL: ICD-10-CM

## 2018-03-27 LAB
CALCIUM 24H UR-MCNC: 2 MG/DL
CALCIUM 24H UR-MRATE: 29 MG/24 HR (ref 100–300)
CALCIUM SPEC-SCNC: 10.5 MG/DL (ref 8.6–10.5)
COLLECT DURATION TIME UR: 24 HRS
COLLECT DURATION TIME UR: 24 HRS
CREAT BLD-MCNC: 1.07 MG/DL (ref 0.57–1)
CREAT UR-MCNC: 69.2 MG/DL
CREATINE 24H UR-MRATE: 1 G/24 HR (ref 0.7–1.6)
GFR SERPL CREATININE-BSD FRML MDRD: 62 ML/MIN/1.73
PTH-INTACT SERPL-MCNC: 96.3 PG/ML (ref 15–65)
SPECIMEN VOL 24H UR: 1450 ML
SPECIMEN VOL 24H UR: 1450 ML

## 2018-03-27 PROCEDURE — 36415 COLL VENOUS BLD VENIPUNCTURE: CPT

## 2018-03-27 PROCEDURE — 82570 ASSAY OF URINE CREATININE: CPT

## 2018-03-27 PROCEDURE — 82310 ASSAY OF CALCIUM: CPT

## 2018-03-27 PROCEDURE — 83970 ASSAY OF PARATHORMONE: CPT

## 2018-03-27 PROCEDURE — 81050 URINALYSIS VOLUME MEASURE: CPT

## 2018-03-27 PROCEDURE — 82340 ASSAY OF CALCIUM IN URINE: CPT

## 2018-03-27 PROCEDURE — 82565 ASSAY OF CREATININE: CPT

## 2018-03-30 RX ORDER — ERGOCALCIFEROL 1.25 MG/1
CAPSULE ORAL
Qty: 13 CAPSULE | Refills: 3 | Status: SHIPPED | OUTPATIENT
Start: 2018-03-30 | End: 2019-02-04 | Stop reason: SDUPTHER

## 2018-05-14 ENCOUNTER — HOSPITAL ENCOUNTER (OUTPATIENT)
Dept: CT IMAGING | Facility: HOSPITAL | Age: 68
Discharge: HOME | End: 2018-05-14
Attending: INTERNAL MEDICINE | Admitting: INTERNAL MEDICINE

## 2018-05-14 DIAGNOSIS — R91.8 PULMONARY NODULES: ICD-10-CM

## 2018-05-14 PROCEDURE — 71250 CT THORAX DX C-: CPT

## 2018-05-22 ENCOUNTER — TRANSCRIBE ORDERS (OUTPATIENT)
Dept: ADMINISTRATIVE | Facility: HOSPITAL | Age: 68
End: 2018-05-22

## 2018-05-22 DIAGNOSIS — R91.8 PULMONARY NODULES: Primary | ICD-10-CM

## 2018-05-30 ENCOUNTER — LAB (OUTPATIENT)
Dept: ENDOCRINOLOGY | Age: 68
End: 2018-05-30

## 2018-05-30 DIAGNOSIS — E83.52 HYPERCALCEMIA: ICD-10-CM

## 2018-05-30 DIAGNOSIS — E04.2 MULTIPLE THYROID NODULES: ICD-10-CM

## 2018-05-30 DIAGNOSIS — E78.2 MIXED HYPERLIPIDEMIA: ICD-10-CM

## 2018-05-30 DIAGNOSIS — E11.8 TYPE 2 DIABETES MELLITUS WITH COMPLICATION, WITHOUT LONG-TERM CURRENT USE OF INSULIN (HCC): ICD-10-CM

## 2018-05-30 DIAGNOSIS — E34.9 INCREASED PTH LEVEL: ICD-10-CM

## 2018-05-30 DIAGNOSIS — I10 BENIGN ESSENTIAL HTN: ICD-10-CM

## 2018-05-30 DIAGNOSIS — E79.0 HYPERURICEMIA: ICD-10-CM

## 2018-05-30 DIAGNOSIS — E04.2 NONTOXIC MULTINODULAR GOITER: ICD-10-CM

## 2018-05-31 LAB
25(OH)D3+25(OH)D2 SERPL-MCNC: 45.6 NG/ML (ref 30–100)
ALBUMIN SERPL-MCNC: 4.5 G/DL (ref 3.5–5.2)
ALBUMIN/GLOB SERPL: 1.3 G/DL
ALP SERPL-CCNC: 77 U/L (ref 39–117)
ALT SERPL-CCNC: 7 U/L (ref 1–33)
AST SERPL-CCNC: 8 U/L (ref 1–32)
BILIRUB SERPL-MCNC: 0.6 MG/DL (ref 0.1–1.2)
BUN SERPL-MCNC: 17 MG/DL (ref 8–23)
BUN/CREAT SERPL: 17.2 (ref 7–25)
C PEPTIDE SERPL-MCNC: 2.1 NG/ML (ref 1.1–4.4)
CA-I SERPL ISE-MCNC: 5.6 MG/DL (ref 4.5–5.6)
CALCIUM SERPL-MCNC: 10.5 MG/DL (ref 8.6–10.5)
CHLORIDE SERPL-SCNC: 96 MMOL/L (ref 98–107)
CHOLEST SERPL-MCNC: 259 MG/DL (ref 0–200)
CO2 SERPL-SCNC: 26.1 MMOL/L (ref 22–29)
CREAT SERPL-MCNC: 0.99 MG/DL (ref 0.57–1)
GFR SERPLBLD CREATININE-BSD FMLA CKD-EPI: 56 ML/MIN/1.73
GFR SERPLBLD CREATININE-BSD FMLA CKD-EPI: 68 ML/MIN/1.73
GLOBULIN SER CALC-MCNC: 3.4 GM/DL
GLUCOSE SERPL-MCNC: 135 MG/DL (ref 65–99)
HBA1C MFR BLD: 7.6 % (ref 4.8–5.6)
HDLC SERPL-MCNC: 35 MG/DL (ref 40–60)
INTERPRETATION: NORMAL
LDLC SERPL CALC-MCNC: 193 MG/DL (ref 0–100)
Lab: NORMAL
MICROALBUMIN UR-MCNC: 5.3 UG/ML
PHOSPHATE SERPL-MCNC: 3.4 MG/DL (ref 2.5–4.5)
POTASSIUM SERPL-SCNC: 4.2 MMOL/L (ref 3.5–5.2)
PROT SERPL-MCNC: 7.9 G/DL (ref 6–8.5)
PTH-INTACT SERPL-MCNC: 60 PG/ML (ref 15–65)
SODIUM SERPL-SCNC: 136 MMOL/L (ref 136–145)
T3FREE SERPL-MCNC: 2.7 PG/ML (ref 2–4.4)
T4 FREE SERPL-MCNC: 1.15 NG/DL (ref 0.93–1.7)
T4 SERPL-MCNC: 6.22 MCG/DL (ref 4.5–11.7)
TRIGL SERPL-MCNC: 156 MG/DL (ref 0–150)
TSH SERPL DL<=0.005 MIU/L-ACNC: 2.27 MIU/ML (ref 0.27–4.2)
URATE SERPL-MCNC: 5.9 MG/DL (ref 2.4–5.7)
VLDLC SERPL CALC-MCNC: 31.2 MG/DL (ref 5–40)

## 2018-08-16 RX ORDER — CANAGLIFLOZIN 300 MG/1
300 TABLET, FILM COATED ORAL DAILY
Qty: 90 TABLET | Refills: 0 | Status: SHIPPED | OUTPATIENT
Start: 2018-08-16 | End: 2018-08-17 | Stop reason: SDUPTHER

## 2018-08-16 RX ORDER — LINAGLIPTIN AND METFORMIN HYDROCHLORIDE 2.5; 1 MG/1; MG/1
2 TABLET, FILM COATED, EXTENDED RELEASE ORAL DAILY
Qty: 180 TABLET | Refills: 0 | Status: SHIPPED | OUTPATIENT
Start: 2018-08-16 | End: 2018-11-20 | Stop reason: SDUPTHER

## 2018-08-17 RX ORDER — CANAGLIFLOZIN 300 MG/1
300 TABLET, FILM COATED ORAL DAILY
Qty: 90 TABLET | Refills: 0 | Status: SHIPPED | OUTPATIENT
Start: 2018-08-17 | End: 2019-02-04

## 2018-09-18 ENCOUNTER — LAB (OUTPATIENT)
Dept: LAB | Facility: HOSPITAL | Age: 68
End: 2018-09-18
Attending: INTERNAL MEDICINE

## 2018-09-18 ENCOUNTER — TRANSCRIBE ORDERS (OUTPATIENT)
Dept: ADMINISTRATIVE | Facility: HOSPITAL | Age: 68
End: 2018-09-18

## 2018-09-18 DIAGNOSIS — E83.52 HYPERCALCEMIA: ICD-10-CM

## 2018-09-18 DIAGNOSIS — I10 ESSENTIAL HYPERTENSION, MALIGNANT: Primary | ICD-10-CM

## 2018-09-18 DIAGNOSIS — I10 ESSENTIAL HYPERTENSION, MALIGNANT: ICD-10-CM

## 2018-09-18 LAB
25(OH)D3 SERPL-MCNC: 31.8 NG/ML (ref 30–100)
ALBUMIN SERPL-MCNC: 4.5 G/DL (ref 3.5–5.2)
ANION GAP SERPL CALCULATED.3IONS-SCNC: 12.7 MMOL/L
BUN BLD-MCNC: 21 MG/DL (ref 8–23)
BUN/CREAT SERPL: 20.2 (ref 7–25)
CALCIUM 24H UR-MCNC: 2.8 MG/DL
CALCIUM 24H UR-MRATE: 39.9 MG/24 HR (ref 100–300)
CALCIUM SPEC-SCNC: 9.8 MG/DL (ref 8.6–10.5)
CALCIUM SPEC-SCNC: 9.9 MG/DL (ref 8.6–10.5)
CHLORIDE SERPL-SCNC: 103 MMOL/L (ref 98–107)
CO2 SERPL-SCNC: 24.3 MMOL/L (ref 22–29)
COLLECT DURATION TIME UR: 24 HRS
CREAT BLD-MCNC: 1.04 MG/DL (ref 0.57–1)
GFR SERPL CREATININE-BSD FRML MDRD: 64 ML/MIN/1.73
GLUCOSE BLD-MCNC: 174 MG/DL (ref 65–99)
PHOSPHATE SERPL-MCNC: 2.8 MG/DL (ref 2.5–4.5)
POTASSIUM BLD-SCNC: 4.1 MMOL/L (ref 3.5–5.2)
PTH-INTACT SERPL-MCNC: 101.7 PG/ML (ref 15–65)
SODIUM BLD-SCNC: 140 MMOL/L (ref 136–145)
SPECIMEN VOL 24H UR: 1425 ML

## 2018-09-18 PROCEDURE — 80069 RENAL FUNCTION PANEL: CPT

## 2018-09-18 PROCEDURE — 82310 ASSAY OF CALCIUM: CPT

## 2018-09-18 PROCEDURE — 82306 VITAMIN D 25 HYDROXY: CPT

## 2018-09-18 PROCEDURE — 36415 COLL VENOUS BLD VENIPUNCTURE: CPT

## 2018-09-18 PROCEDURE — 83970 ASSAY OF PARATHORMONE: CPT

## 2018-09-18 PROCEDURE — 82340 ASSAY OF CALCIUM IN URINE: CPT

## 2018-09-18 PROCEDURE — 81050 URINALYSIS VOLUME MEASURE: CPT

## 2018-11-20 RX ORDER — CANAGLIFLOZIN 300 MG/1
TABLET, FILM COATED ORAL
Qty: 30 TABLET | Refills: 0 | Status: SHIPPED | OUTPATIENT
Start: 2018-11-20 | End: 2019-02-04 | Stop reason: SDUPTHER

## 2018-11-20 RX ORDER — LINAGLIPTIN AND METFORMIN HYDROCHLORIDE 2.5; 1 MG/1; MG/1
TABLET, FILM COATED, EXTENDED RELEASE ORAL
Qty: 60 TABLET | Refills: 0 | Status: SHIPPED | OUTPATIENT
Start: 2018-11-20 | End: 2019-02-04 | Stop reason: SDUPTHER

## 2019-02-04 ENCOUNTER — OFFICE VISIT (OUTPATIENT)
Dept: ENDOCRINOLOGY | Age: 69
End: 2019-02-04

## 2019-02-04 VITALS
BODY MASS INDEX: 25.39 KG/M2 | DIASTOLIC BLOOD PRESSURE: 76 MMHG | RESPIRATION RATE: 16 BRPM | SYSTOLIC BLOOD PRESSURE: 128 MMHG | HEIGHT: 66 IN | WEIGHT: 158 LBS

## 2019-02-04 DIAGNOSIS — E04.2 NONTOXIC MULTINODULAR GOITER: ICD-10-CM

## 2019-02-04 DIAGNOSIS — E78.2 MIXED HYPERLIPIDEMIA: ICD-10-CM

## 2019-02-04 DIAGNOSIS — E83.52 HYPERCALCEMIA: ICD-10-CM

## 2019-02-04 DIAGNOSIS — I10 BENIGN ESSENTIAL HTN: ICD-10-CM

## 2019-02-04 DIAGNOSIS — E79.0 HYPERURICEMIA: ICD-10-CM

## 2019-02-04 DIAGNOSIS — E11.8 TYPE 2 DIABETES MELLITUS WITH COMPLICATION, WITHOUT LONG-TERM CURRENT USE OF INSULIN (HCC): Primary | ICD-10-CM

## 2019-02-04 PROCEDURE — 99214 OFFICE O/P EST MOD 30 MIN: CPT | Performed by: INTERNAL MEDICINE

## 2019-02-04 RX ORDER — EMPAGLIFLOZIN 25 MG/1
1 TABLET, FILM COATED ORAL DAILY
Qty: 90 TABLET | Refills: 3 | Status: SHIPPED | OUTPATIENT
Start: 2019-02-04 | End: 2020-02-27 | Stop reason: SDUPTHER

## 2019-02-04 RX ORDER — ERGOCALCIFEROL 1.25 MG/1
50000 CAPSULE ORAL WEEKLY
Qty: 13 CAPSULE | Refills: 3 | Status: SHIPPED | OUTPATIENT
Start: 2019-02-04 | End: 2020-02-27 | Stop reason: SDUPTHER

## 2019-02-04 RX ORDER — BLOOD SUGAR DIAGNOSTIC
STRIP MISCELLANEOUS
Qty: 60 EACH | Refills: 12 | Status: SHIPPED | OUTPATIENT
Start: 2019-02-04 | End: 2020-02-27 | Stop reason: SDUPTHER

## 2019-02-04 RX ORDER — TELMISARTAN 80 MG/1
80 TABLET ORAL DAILY
Qty: 90 TABLET | Refills: 3 | Status: SHIPPED | OUTPATIENT
Start: 2019-02-04 | End: 2020-02-27 | Stop reason: SDUPTHER

## 2019-02-04 RX ORDER — ROSUVASTATIN CALCIUM 40 MG/1
40 TABLET, COATED ORAL DAILY
Qty: 90 TABLET | Refills: 3 | Status: SHIPPED | OUTPATIENT
Start: 2019-02-04 | End: 2020-02-27 | Stop reason: SDUPTHER

## 2019-02-04 RX ORDER — LINAGLIPTIN AND METFORMIN HYDROCHLORIDE 2.5; 1 MG/1; MG/1
2 TABLET, FILM COATED, EXTENDED RELEASE ORAL DAILY
Qty: 180 TABLET | Refills: 3 | Status: SHIPPED | OUTPATIENT
Start: 2019-02-04 | End: 2020-02-27 | Stop reason: SDUPTHER

## 2019-02-04 RX ORDER — LANCETS
EACH MISCELLANEOUS
Qty: 60 EACH | Refills: 5 | Status: SHIPPED | OUTPATIENT
Start: 2019-02-04 | End: 2020-03-04 | Stop reason: CLARIF

## 2019-02-04 NOTE — PROGRESS NOTES
"Subjective   Roger Siegel is a 68 y.o. female seen for follow up for DM2, goiter, hyperlipidemia, HTN, hyperparathyroidism, vit d deficiency. No lab review. Patient states that she has not been checking her BG and is requesting a new meter. She states that her  was diagnosed with liver cancer which has caused stress and messed up her world. She has been doing physical therapy for her knee.     History of Present Illness this is a 68-year-old female known patient with type II diabetes multinodular goiter as well as hypertension and dyslipidemia and hypercalcemia and vitamin D deficiency.  Over the course of last 6 months she herself has not had any health problems for which to go to the emergency room or hospital however she is under a lot of stress by virtue of her  being diagnosed with liver cancer.  She said based on the recommendation from Dr. Silverio she was seen by a nephrologist who will ask her to stop vitamin D and so she has not taken it for the past few months.    /76   Resp 16   Ht 167.6 cm (66\")   Wt 71.7 kg (158 lb)   BMI 25.50 kg/m²      No Known Allergies    Current Outpatient Medications:   •  ADVAIR DISKUS 500-50 MCG/DOSE DISKUS, Inhale 1 puff 2 (Two) Times a Day., Disp: , Rfl:   •  amLODIPine (NORVASC) 5 MG tablet, Take 5 mg by mouth Daily., Disp: , Rfl:   •  BENICAR 40 MG tablet, Take 40 mg by mouth Daily., Disp: , Rfl:   •  Blood Glucose Monitoring Suppl (ACCU-CHEK SONIA PLUS) W/DEVICE kit, , Disp: , Rfl:   •  cetirizine (zyrTEC) 10 MG tablet, Take 10 mg by mouth Daily As Needed., Disp: , Rfl:   •  fluticasone (FLONASE) 50 MCG/ACT nasal spray, 2 sprays into each nostril Daily., Disp: , Rfl:   •  GuaiFENesin ER (MUCINEX MAXIMUM STRENGTH) 1200 MG tablet sustained-release 12 hour, Take 1 tablet by mouth As Needed., Disp: , Rfl:   •  INVOKANA 300 MG tablet, Take 300 mg by mouth Daily., Disp: 90 tablet, Rfl: 0  •  JENTADUETO XR 2.5-1000 MG tablet sustained-release 24 hour, " TAKE 2 TABLETS BY MOUTH ONCE DAILY, Disp: 60 tablet, Rfl: 0  •  QUEtiapine (SEROQUEL) 50 MG tablet, Take 1 tablet by mouth Every Night., Disp: 30 tablet, Rfl: 11  •  rosuvastatin (CRESTOR) 40 MG tablet, Take 1 tablet by mouth Daily., Disp: 30 tablet, Rfl: 5  •  VENTOLIN  (90 BASE) MCG/ACT inhaler, Inhale 2 puffs Every 6 (Six) Hours As Needed., Disp: , Rfl:   •  vitamin D (ERGOCALCIFEROL) 17509 units capsule capsule, Take 1 capsule by mouth 1 (One) Time Per Week. SUNDAYS, Disp: 13 capsule, Rfl: 3    The following portions of the patient's history were reviewed and updated as appropriate: allergies, current medications, past family history, past medical history, past social history, past surgical history and problem list.    Review of Systems   Constitutional: Negative.    HENT: Negative.    Eyes: Negative.    Respiratory: Negative.    Cardiovascular: Negative.    Gastrointestinal: Negative.    Endocrine: Negative.    Genitourinary: Negative.    Musculoskeletal: Negative.    Skin: Negative.    Allergic/Immunologic: Negative.    Neurological: Negative.    Hematological: Negative.    Psychiatric/Behavioral: Negative.        Objective   Physical Exam   Constitutional: She is oriented to person, place, and time. She appears well-developed and well-nourished. No distress.   HENT:   Head: Normocephalic and atraumatic.   Right Ear: External ear normal.   Left Ear: External ear normal.   Nose: Nose normal.   Mouth/Throat: Oropharynx is clear and moist. No oropharyngeal exudate.   Eyes: Conjunctivae and EOM are normal. Pupils are equal, round, and reactive to light. Right eye exhibits no discharge. Left eye exhibits no discharge. No scleral icterus.   Neck: Trachea normal, normal range of motion and full passive range of motion without pain. Neck supple. No JVD present. No tracheal tenderness present. Carotid bruit is not present. No tracheal deviation, no edema and no erythema present. No thyroid mass and no  thyromegaly present.   Healed the scar of parathyroidectomy in lower neck.   Cardiovascular: Normal rate, regular rhythm, normal heart sounds and intact distal pulses. Exam reveals no gallop and no friction rub.   No murmur heard.  Pulmonary/Chest: Effort normal and breath sounds normal. No stridor. No respiratory distress. She has no wheezes. She has no rales. She exhibits no tenderness.   Abdominal: Soft. Bowel sounds are normal. She exhibits no distension and no mass. There is no tenderness. There is no rebound and no guarding. No hernia.   Musculoskeletal: Normal range of motion. She exhibits no edema, tenderness or deformity.   Lymphadenopathy:     She has no cervical adenopathy.   Neurological: She is alert and oriented to person, place, and time. She has normal reflexes. She displays normal reflexes. No cranial nerve deficit or sensory deficit. She exhibits normal muscle tone. Coordination normal.   Skin: Skin is warm and dry. No rash noted. She is not diaphoretic. No erythema. No pallor.   Psychiatric: She has a normal mood and affect. Her behavior is normal. Judgment and thought content normal.   Nursing note and vitals reviewed.        Assessment/Plan   Diagnoses and all orders for this visit:    Type 2 diabetes mellitus with complication, without long-term current use of insulin (CMS/[facility])  -     T3, Free  -     T4 & TSH ([facility])  -     T4, Free  -     Uric Acid  -     Comprehensive Metabolic Panel  -     Comprehensive Thyroglobulin  -     C-Peptide  -     Hemoglobin A1c  -     Lipid Panel  -     MicroAlbumin, Urine, Random - Urine, Clean Catch  -     Calcium, Ionized  -     Phosphorus  -     PTH, Intact  -     T4 & TSH ([facility]); Future  -     T3, Free; Future  -     T4, Free; Future  -     Uric Acid; Future  -     Vitamin D 25 Hydroxy; Future  -     Comprehensive Metabolic Panel; Future  -     C-Peptide; Future  -     Hemoglobin A1c; Future  -     Lipid Panel; Future  -     MicroAlbumin, Urine, Random -  Urine, Clean Catch; Future  -     Calcium, Ionized; Future  -     Phosphorus; Future  -     PTH, Intact; Future    Benign essential HTN  -     T3, Free  -     T4 & TSH ([facility])  -     T4, Free  -     Uric Acid  -     Comprehensive Metabolic Panel  -     Comprehensive Thyroglobulin  -     C-Peptide  -     Hemoglobin A1c  -     Lipid Panel  -     MicroAlbumin, Urine, Random - Urine, Clean Catch  -     Calcium, Ionized  -     Phosphorus  -     PTH, Intact  -     T4 & TSH ([facility]); Future  -     T3, Free; Future  -     T4, Free; Future  -     Uric Acid; Future  -     Vitamin D 25 Hydroxy; Future  -     Comprehensive Metabolic Panel; Future  -     C-Peptide; Future  -     Hemoglobin A1c; Future  -     Lipid Panel; Future  -     MicroAlbumin, Urine, Random - Urine, Clean Catch; Future  -     Calcium, Ionized; Future  -     Phosphorus; Future  -     PTH, Intact; Future    Mixed hyperlipidemia  -     T3, Free  -     T4 & TSH ([facility])  -     T4, Free  -     Uric Acid  -     Comprehensive Metabolic Panel  -     Comprehensive Thyroglobulin  -     C-Peptide  -     Hemoglobin A1c  -     Lipid Panel  -     MicroAlbumin, Urine, Random - Urine, Clean Catch  -     Calcium, Ionized  -     Phosphorus  -     PTH, Intact  -     T4 & TSH ([facility]); Future  -     T3, Free; Future  -     T4, Free; Future  -     Uric Acid; Future  -     Vitamin D 25 Hydroxy; Future  -     Comprehensive Metabolic Panel; Future  -     C-Peptide; Future  -     Hemoglobin A1c; Future  -     Lipid Panel; Future  -     MicroAlbumin, Urine, Random - Urine, Clean Catch; Future  -     Calcium, Ionized; Future  -     Phosphorus; Future  -     PTH, Intact; Future    Nontoxic multinodular goiter  -     T3, Free  -     T4 & TSH ([facility])  -     T4, Free  -     Uric Acid  -     Comprehensive Metabolic Panel  -     Comprehensive Thyroglobulin  -     C-Peptide  -     Hemoglobin A1c  -     Lipid Panel  -     MicroAlbumin, Urine, Random - Urine, Clean Catch  -      Calcium, Ionized  -     Phosphorus  -     PTH, Intact  -     T4 & TSH ([facility]); Future  -     T3, Free; Future  -     T4, Free; Future  -     Uric Acid; Future  -     Vitamin D 25 Hydroxy; Future  -     Comprehensive Metabolic Panel; Future  -     C-Peptide; Future  -     Hemoglobin A1c; Future  -     Lipid Panel; Future  -     MicroAlbumin, Urine, Random - Urine, Clean Catch; Future  -     Calcium, Ionized; Future  -     Phosphorus; Future  -     PTH, Intact; Future    Hyperuricemia  -     T3, Free  -     T4 & TSH ([facility])  -     T4, Free  -     Uric Acid  -     Comprehensive Metabolic Panel  -     Comprehensive Thyroglobulin  -     C-Peptide  -     Hemoglobin A1c  -     Lipid Panel  -     MicroAlbumin, Urine, Random - Urine, Clean Catch  -     Calcium, Ionized  -     Phosphorus  -     PTH, Intact  -     T4 & TSH ([facility]); Future  -     T3, Free; Future  -     T4, Free; Future  -     Uric Acid; Future  -     Vitamin D 25 Hydroxy; Future  -     Comprehensive Metabolic Panel; Future  -     C-Peptide; Future  -     Hemoglobin A1c; Future  -     Lipid Panel; Future  -     MicroAlbumin, Urine, Random - Urine, Clean Catch; Future  -     Calcium, Ionized; Future  -     Phosphorus; Future  -     PTH, Intact; Future    Hypercalcemia  -     T3, Free  -     T4 & TSH ([facility])  -     T4, Free  -     Uric Acid  -     Comprehensive Metabolic Panel  -     Comprehensive Thyroglobulin  -     C-Peptide  -     Hemoglobin A1c  -     Lipid Panel  -     MicroAlbumin, Urine, Random - Urine, Clean Catch  -     Calcium, Ionized  -     Phosphorus  -     PTH, Intact  -     telmisartan (MICARDIS) 80 MG tablet; Take 1 tablet by mouth Daily.  -     T4 & TSH ([facility]); Future  -     T3, Free; Future  -     T4, Free; Future  -     Uric Acid; Future  -     Vitamin D 25 Hydroxy; Future  -     Comprehensive Metabolic Panel; Future  -     C-Peptide; Future  -     Hemoglobin A1c; Future  -     Lipid Panel; Future  -     MicroAlbumin, Urine, Random  - Urine, Clean Catch; Future  -     Calcium, Ionized; Future  -     Phosphorus; Future  -     PTH, Intact; Future    Other orders  -     vitamin D (ERGOCALCIFEROL) 12476 units capsule capsule; Take 1 capsule by mouth 1 (One) Time Per Week. SUNDAYS  -     rosuvastatin (CRESTOR) 40 MG tablet; Take 1 tablet by mouth Daily.  -     JENTADUETO XR 2.5-1000 MG tablet sustained-release 24 hour; Take 2 tablets by mouth Daily.  -     JARDIANCE 25 MG tablet; Take 25 mg by mouth Daily.  -     ONETOUCH VERIO test strip; Check blood glucose twice daily Use as instructed  -     Lancets (ONETOUCH ULTRASOFT) lancets; Check blood glucose twice daily               In summary I saw and examined this 68-year-old female for above-mentioned problems.  I reviewed her laboratory evaluation of 05/30/2018 as well as 09/13/2018 and the parathyroid scan done at the [facility] dated 07/26/2017 which reports the presence of left cervical parathyroid adenoma she is a status post parathyroidectomy however her laboratory evaluation of 09/13/2018 shows is still her parathyroid remains elevated and therefore we will go ahead and order additional laboratory evaluation and once the results come back we will go ahead and call for any other changes and modifications.  She will see Ms. Esther Christensen in 4 months or sooner if needed with laboratory evaluation prior to each office visit.

## 2019-02-06 LAB
ALBUMIN SERPL-MCNC: 4.5 G/DL (ref 3.5–5.2)
ALBUMIN/GLOB SERPL: 1.4 G/DL
ALP SERPL-CCNC: 61 U/L (ref 39–117)
ALT SERPL-CCNC: 7 U/L (ref 1–33)
AST SERPL-CCNC: 10 U/L (ref 1–32)
BILIRUB SERPL-MCNC: 0.4 MG/DL (ref 0.1–1.2)
BUN SERPL-MCNC: 14 MG/DL (ref 8–23)
BUN/CREAT SERPL: 15.1 (ref 7–25)
C PEPTIDE SERPL-MCNC: 3 NG/ML (ref 1.1–4.4)
CA-I SERPL ISE-MCNC: 6 MG/DL (ref 4.5–5.6)
CALCIUM SERPL-MCNC: 10.6 MG/DL (ref 8.6–10.5)
CHLORIDE SERPL-SCNC: 101 MMOL/L (ref 98–107)
CHOLEST SERPL-MCNC: 283 MG/DL (ref 0–200)
CO2 SERPL-SCNC: 24.5 MMOL/L (ref 22–29)
CREAT SERPL-MCNC: 0.93 MG/DL (ref 0.57–1)
GLOBULIN SER CALC-MCNC: 3.2 GM/DL
GLUCOSE SERPL-MCNC: 124 MG/DL (ref 65–99)
HBA1C MFR BLD: 7.47 % (ref 4.8–5.6)
HDLC SERPL-MCNC: 35 MG/DL (ref 40–60)
INTERPRETATION: NORMAL
LDLC SERPL CALC-MCNC: 221 MG/DL (ref 0–100)
Lab: NORMAL
MICROALBUMIN UR-MCNC: 19.2 UG/ML
PHOSPHATE SERPL-MCNC: 3.1 MG/DL (ref 2.5–4.5)
POTASSIUM SERPL-SCNC: 3.9 MMOL/L (ref 3.5–5.2)
PROT SERPL-MCNC: 7.7 G/DL (ref 6–8.5)
PTH-INTACT SERPL-MCNC: 64 PG/ML (ref 15–65)
SODIUM SERPL-SCNC: 137 MMOL/L (ref 136–145)
T3FREE SERPL-MCNC: 2.6 PG/ML (ref 2–4.4)
T4 FREE SERPL-MCNC: 1.27 NG/DL (ref 0.93–1.7)
T4 SERPL-MCNC: 6.77 MCG/DL (ref 4.5–11.7)
THYROGLOB AB SERPL-ACNC: <1 IU/ML
THYROGLOB SERPL-MCNC: 6.1 NG/ML
THYROGLOB SERPL-MCNC: NORMAL NG/ML
TRIGL SERPL-MCNC: 133 MG/DL (ref 0–150)
TSH SERPL DL<=0.005 MIU/L-ACNC: 1.51 MIU/ML (ref 0.27–4.2)
URATE SERPL-MCNC: 5.6 MG/DL (ref 2.4–5.7)
VLDLC SERPL CALC-MCNC: 26.6 MG/DL (ref 5–40)

## 2019-05-29 ENCOUNTER — RESULTS ENCOUNTER (OUTPATIENT)
Dept: ENDOCRINOLOGY | Age: 69
End: 2019-05-29

## 2019-05-29 DIAGNOSIS — E83.52 HYPERCALCEMIA: ICD-10-CM

## 2019-05-29 DIAGNOSIS — E78.2 MIXED HYPERLIPIDEMIA: ICD-10-CM

## 2019-05-29 DIAGNOSIS — E04.2 NONTOXIC MULTINODULAR GOITER: ICD-10-CM

## 2019-05-29 DIAGNOSIS — E79.0 HYPERURICEMIA: ICD-10-CM

## 2019-05-29 DIAGNOSIS — I10 BENIGN ESSENTIAL HTN: ICD-10-CM

## 2019-05-29 DIAGNOSIS — E11.8 TYPE 2 DIABETES MELLITUS WITH COMPLICATION, WITHOUT LONG-TERM CURRENT USE OF INSULIN (HCC): ICD-10-CM

## 2019-06-03 DIAGNOSIS — E78.2 MIXED HYPERLIPIDEMIA: ICD-10-CM

## 2019-06-03 DIAGNOSIS — E79.0 HYPERURICEMIA: ICD-10-CM

## 2019-06-03 DIAGNOSIS — E04.2 NONTOXIC MULTINODULAR GOITER: ICD-10-CM

## 2019-06-03 DIAGNOSIS — E83.52 HYPERCALCEMIA: ICD-10-CM

## 2019-06-03 DIAGNOSIS — E11.8 TYPE 2 DIABETES MELLITUS WITH COMPLICATION, WITHOUT LONG-TERM CURRENT USE OF INSULIN (HCC): Primary | ICD-10-CM

## 2019-07-03 ENCOUNTER — RESULTS ENCOUNTER (OUTPATIENT)
Dept: ENDOCRINOLOGY | Age: 69
End: 2019-07-03

## 2019-07-03 DIAGNOSIS — E04.2 NONTOXIC MULTINODULAR GOITER: ICD-10-CM

## 2019-07-03 DIAGNOSIS — E79.0 HYPERURICEMIA: ICD-10-CM

## 2019-07-03 DIAGNOSIS — E83.52 HYPERCALCEMIA: ICD-10-CM

## 2019-07-03 DIAGNOSIS — E11.8 TYPE 2 DIABETES MELLITUS WITH COMPLICATION, WITHOUT LONG-TERM CURRENT USE OF INSULIN (HCC): ICD-10-CM

## 2019-07-03 DIAGNOSIS — E78.2 MIXED HYPERLIPIDEMIA: ICD-10-CM

## 2019-09-16 RX ORDER — QUETIAPINE FUMARATE 50 MG/1
50 TABLET, FILM COATED ORAL NIGHTLY
Qty: 30 TABLET | Refills: 11 | OUTPATIENT
Start: 2019-09-16

## 2019-10-14 RX ORDER — QUETIAPINE FUMARATE 50 MG/1
50 TABLET, FILM COATED ORAL NIGHTLY
Qty: 30 TABLET | Refills: 11 | Status: SHIPPED | OUTPATIENT
Start: 2019-10-14

## 2020-02-16 LAB
25(OH)D3+25(OH)D2 SERPL-MCNC: 74.6 NG/ML (ref 30–100)
ALBUMIN SERPL-MCNC: 4.4 G/DL (ref 3.5–5.2)
ALBUMIN/GLOB SERPL: 1.4 G/DL
ALP SERPL-CCNC: 79 U/L (ref 39–117)
ALT SERPL-CCNC: 5 U/L (ref 1–33)
AST SERPL-CCNC: 10 U/L (ref 1–32)
BILIRUB SERPL-MCNC: 0.2 MG/DL (ref 0.2–1.2)
BUN SERPL-MCNC: 20 MG/DL (ref 8–23)
BUN/CREAT SERPL: 20.6 (ref 7–25)
C PEPTIDE SERPL-MCNC: 2.8 NG/ML (ref 1.1–4.4)
CA-I SERPL ISE-MCNC: 5.7 MG/DL (ref 4.5–5.6)
CALCIUM SERPL-MCNC: 9.9 MG/DL (ref 8.6–10.5)
CHLORIDE SERPL-SCNC: 103 MMOL/L (ref 98–107)
CHOLEST SERPL-MCNC: 148 MG/DL (ref 0–200)
CO2 SERPL-SCNC: 26.1 MMOL/L (ref 22–29)
CREAT SERPL-MCNC: 0.97 MG/DL (ref 0.57–1)
FT4I SERPL CALC-MCNC: 2 (ref 1.2–4.9)
GLOBULIN SER CALC-MCNC: 3.2 GM/DL
GLUCOSE SERPL-MCNC: 121 MG/DL (ref 65–99)
HBA1C MFR BLD: 7.1 % (ref 4.8–5.6)
HDLC SERPL-MCNC: 31 MG/DL (ref 40–60)
INTERPRETATION: NORMAL
LDLC SERPL CALC-MCNC: 95 MG/DL (ref 0–100)
Lab: NORMAL
MICROALBUMIN UR-MCNC: 270.6 UG/ML
PHOSPHATE SERPL-MCNC: 4.1 MG/DL (ref 2.5–4.5)
POTASSIUM SERPL-SCNC: 4.5 MMOL/L (ref 3.5–5.2)
PROT SERPL-MCNC: 7.6 G/DL (ref 6–8.5)
PTH-INTACT SERPL-MCNC: 43 PG/ML (ref 15–65)
SODIUM SERPL-SCNC: 143 MMOL/L (ref 136–145)
T3FREE SERPL-MCNC: 2.8 PG/ML (ref 2–4.4)
T3RU NFR SERPL: 29 % (ref 24–39)
T4 FREE SERPL-MCNC: 1.28 NG/DL (ref 0.93–1.7)
T4 SERPL-MCNC: 6.8 UG/DL (ref 4.5–12)
THYROGLOB AB SERPL-ACNC: <1 IU/ML
THYROGLOB SERPL-MCNC: 9 NG/ML
THYROGLOB SERPL-MCNC: NORMAL NG/ML
TRIGL SERPL-MCNC: 108 MG/DL (ref 0–150)
TSH SERPL DL<=0.005 MIU/L-ACNC: 2.27 UIU/ML (ref 0.45–4.5)
URATE SERPL-MCNC: 6.6 MG/DL (ref 2.4–5.7)
VLDLC SERPL CALC-MCNC: 21.6 MG/DL

## 2020-02-27 ENCOUNTER — OFFICE VISIT (OUTPATIENT)
Dept: ENDOCRINOLOGY | Age: 70
End: 2020-02-27

## 2020-02-27 VITALS
HEIGHT: 66 IN | WEIGHT: 148 LBS | BODY MASS INDEX: 23.78 KG/M2 | SYSTOLIC BLOOD PRESSURE: 120 MMHG | DIASTOLIC BLOOD PRESSURE: 76 MMHG

## 2020-02-27 DIAGNOSIS — R63.4 WEIGHT LOSS: ICD-10-CM

## 2020-02-27 DIAGNOSIS — E11.8 TYPE 2 DIABETES MELLITUS WITH COMPLICATION, WITHOUT LONG-TERM CURRENT USE OF INSULIN (HCC): Primary | ICD-10-CM

## 2020-02-27 DIAGNOSIS — I10 ESSENTIAL HYPERTENSION: ICD-10-CM

## 2020-02-27 DIAGNOSIS — E55.9 VITAMIN D DEFICIENCY DISEASE: ICD-10-CM

## 2020-02-27 DIAGNOSIS — Z98.890 HISTORY OF PARATHYROID SURGERY: ICD-10-CM

## 2020-02-27 DIAGNOSIS — I10 BENIGN ESSENTIAL HTN: ICD-10-CM

## 2020-02-27 DIAGNOSIS — E83.52 HYPERCALCEMIA: ICD-10-CM

## 2020-02-27 DIAGNOSIS — E04.2 NONTOXIC MULTINODULAR GOITER: ICD-10-CM

## 2020-02-27 DIAGNOSIS — E79.0 HYPERURICEMIA: ICD-10-CM

## 2020-02-27 DIAGNOSIS — E78.2 MIXED HYPERLIPIDEMIA: ICD-10-CM

## 2020-02-27 PROCEDURE — 99214 OFFICE O/P EST MOD 30 MIN: CPT | Performed by: NURSE PRACTITIONER

## 2020-02-27 RX ORDER — ROSUVASTATIN CALCIUM 40 MG/1
40 TABLET, COATED ORAL DAILY
Qty: 90 TABLET | Refills: 1 | Status: SHIPPED | OUTPATIENT
Start: 2020-02-27 | End: 2020-10-05

## 2020-02-27 RX ORDER — EMPAGLIFLOZIN 25 MG/1
1 TABLET, FILM COATED ORAL DAILY
Qty: 90 TABLET | Refills: 1 | Status: SHIPPED | OUTPATIENT
Start: 2020-02-27 | End: 2020-09-21

## 2020-02-27 RX ORDER — LINAGLIPTIN AND METFORMIN HYDROCHLORIDE 2.5; 1 MG/1; MG/1
2 TABLET, FILM COATED, EXTENDED RELEASE ORAL DAILY
Qty: 180 TABLET | Refills: 1 | Status: SHIPPED | OUTPATIENT
Start: 2020-02-27 | End: 2020-09-22

## 2020-02-27 RX ORDER — BLOOD SUGAR DIAGNOSTIC
STRIP MISCELLANEOUS
Qty: 200 EACH | Refills: 1 | Status: SHIPPED | OUTPATIENT
Start: 2020-02-27 | End: 2020-03-04 | Stop reason: CLARIF

## 2020-02-27 RX ORDER — TELMISARTAN 80 MG/1
80 TABLET ORAL DAILY
Qty: 90 TABLET | Refills: 1 | Status: SHIPPED | OUTPATIENT
Start: 2020-02-27 | End: 2020-11-02 | Stop reason: SDUPTHER

## 2020-02-27 RX ORDER — ERGOCALCIFEROL 1.25 MG/1
50000 CAPSULE ORAL WEEKLY
Qty: 13 CAPSULE | Refills: 1 | Status: SHIPPED | OUTPATIENT
Start: 2020-02-27 | End: 2020-09-22

## 2020-02-27 NOTE — PROGRESS NOTES
"Thomas Siegel is a 69 y.o. female is here today for follow-up.  Chief Complaint   Patient presents with   • Diabetes     recent labs  Bg 1x daily   • Hypertension   • Hyperlipidemia     /76 (BP Location: Left arm, Patient Position: Sitting, Cuff Size: Adult)   Ht 167.6 cm (66\")   Wt 67.1 kg (148 lb)   BMI 23.89 kg/m²   Current Outpatient Medications on File Prior to Visit   Medication Sig   • ADVAIR DISKUS 500-50 MCG/DOSE DISKUS Inhale 1 puff 2 (Two) Times a Day.   • amLODIPine (NORVASC) 5 MG tablet Take 5 mg by mouth Daily.   • Blood Glucose Monitoring Suppl (ACCU-CHEK SONIA PLUS) W/DEVICE kit    • cetirizine (zyrTEC) 10 MG tablet Take 10 mg by mouth Daily As Needed.   • fluticasone (FLONASE) 50 MCG/ACT nasal spray 2 sprays into each nostril Daily.   • JARDIANCE 25 MG tablet Take 25 mg by mouth Daily.   • JENTADUETO XR 2.5-1000 MG tablet sustained-release 24 hour Take 2 tablets by mouth Daily.   • Lancets (ONETOUCH ULTRASOFT) lancets Check blood glucose twice daily   • ONETOUCH VERIO test strip Check blood glucose twice daily Use as instructed   • QUEtiapine (SEROQUEL) 50 MG tablet Take 1 tablet by mouth Every Night.   • rosuvastatin (CRESTOR) 40 MG tablet Take 1 tablet by mouth Daily.   • telmisartan (MICARDIS) 80 MG tablet Take 1 tablet by mouth Daily.   • VENTOLIN  (90 BASE) MCG/ACT inhaler Inhale 2 puffs Every 6 (Six) Hours As Needed.   • vitamin D (ERGOCALCIFEROL) 19155 units capsule capsule Take 1 capsule by mouth 1 (One) Time Per Week. SUNDAYS   • GuaiFENesin ER (MUCINEX MAXIMUM STRENGTH) 1200 MG tablet sustained-release 12 hour Take 1 tablet by mouth As Needed.     No current facility-administered medications on file prior to visit.      Family History   Problem Relation Age of Onset   • Heart disease Mother    • Ovarian cancer Mother    • Heart disease Father    • Colon polyps Father      Social History     Tobacco Use   • Smoking status: Former Smoker     Packs/day: 1.00 "     Years: 40.00     Pack years: 40.00     Types: Cigarettes     Last attempt to quit:      Years since quittin.1   • Smokeless tobacco: Never Used   Substance Use Topics   • Alcohol use: Yes     Comment: occassionally    • Drug use: No     No Known Allergies      History of Present Illness   Encounter Diagnoses   Name Primary?   • Type 2 diabetes mellitus with complication, without long-term current use of insulin (CMS/[facility]) Yes   • Hyperuricemia    • Essential hypertension    • Mixed hyperlipidemia    • Hypercalcemia    • Benign essential HTN    • History of parathyroid surgery    • Nontoxic multinodular goiter    • Vitamin D deficiency disease      69-year-old female patient here today for routine follow-up visit.  She has been seen for the above-mentioned problems.  She has steadily declined in her weight because she states she is not eating as much.  She has become more sedentary.  She is grieving the death of her  as result of liver cancer.  She has had significant lifestyle change as result of his death.  Her medication list was reviewed and updated.  She was also educated regarding losing calories as a result of the Jardiance.  She is not at risk for hypoglycemia.  She was given a new blood glucose meter at today's visit.  She is currently checking her blood sugars 1-2 times daily.  A new prescription for strips has been sent.  She has a history of parathyroidectomy.  Her overall calcium level is in satisfactory range.  Her ionized calcium is slightly elevated however parathyroid hormone is in normal range.  She has high uric acid however she does not want to take another medication to lower her uric acid.  She denies any history of gout or kidney stones  The following portions of the patient's history were reviewed and updated as appropriate: allergies, current medications, past family history, past medical history, past social history, past surgical history and problem list.    Review of  Systems   Constitutional: Negative for appetite change and fatigue.   Eyes: Negative for visual disturbance.   Respiratory: Negative for cough.    Cardiovascular: Negative for leg swelling.   Gastrointestinal: Negative for constipation and diarrhea.   Endocrine: Negative for cold intolerance, heat intolerance, polydipsia, polyphagia and polyuria.   Genitourinary: Negative for frequency.   Neurological: Negative for numbness.       Objective   Physical Exam   Constitutional: She is oriented to person, place, and time. She appears well-developed and well-nourished. No distress.   HENT:   Head: Normocephalic and atraumatic.   Right Ear: External ear normal.   Left Ear: External ear normal.   Nose: Nose normal.   Mouth/Throat: Oropharynx is clear and moist. No oropharyngeal exudate.   Eyes: Pupils are equal, round, and reactive to light. Conjunctivae and EOM are normal. Right eye exhibits no discharge. Left eye exhibits no discharge. No scleral icterus.   Neck: Trachea normal, normal range of motion and full passive range of motion without pain. Neck supple. No JVD present. No tracheal tenderness present. Carotid bruit is not present. No tracheal deviation, no edema and no erythema present. No thyroid mass and no thyromegaly present.   Healed the scar of parathyroidectomy in lower neck.   Cardiovascular: Normal rate, regular rhythm, normal heart sounds and intact distal pulses. Exam reveals no gallop and no friction rub.   No murmur heard.  Pulmonary/Chest: Effort normal and breath sounds normal. No stridor. No respiratory distress. She has no wheezes. She has no rales. She exhibits no tenderness.   Abdominal: Soft. Bowel sounds are normal. She exhibits no distension and no mass. There is no tenderness. There is no rebound and no guarding. No hernia.   Musculoskeletal: Normal range of motion. She exhibits no edema, tenderness or deformity.   Lymphadenopathy:     She has no cervical adenopathy.   Neurological: She is  alert and oriented to person, place, and time. She has normal reflexes. She displays normal reflexes. No cranial nerve deficit or sensory deficit. She exhibits normal muscle tone. Coordination normal.   Skin: Skin is warm and dry. No rash noted. She is not diaphoretic. No erythema. No pallor.   Psychiatric: She has a normal mood and affect. Her behavior is normal. Judgment and thought content normal.   Nursing note and vitals reviewed.    Results for orders placed or performed in visit on 07/03/19   Comprehensive Metabolic Panel   Result Value Ref Range    Glucose 121 (H) 65 - 99 mg/dL    BUN 20 8 - 23 mg/dL    Creatinine 0.97 0.57 - 1.00 mg/dL    eGFR Non African Am 57 (L) >60 mL/min/1.73    eGFR African Am 69 >60 mL/min/1.73    BUN/Creatinine Ratio 20.6 7.0 - 25.0    Sodium 143 136 - 145 mmol/L    Potassium 4.5 3.5 - 5.2 mmol/L    Chloride 103 98 - 107 mmol/L    Total CO2 26.1 22.0 - 29.0 mmol/L    Calcium 9.9 8.6 - 10.5 mg/dL    Total Protein 7.6 6.0 - 8.5 g/dL    Albumin 4.40 3.50 - 5.20 g/dL    Globulin 3.2 gm/dL    A/G Ratio 1.4 g/dL    Total Bilirubin 0.2 0.2 - 1.2 mg/dL    Alkaline Phosphatase 79 39 - 117 U/L    AST (SGOT) 10 1 - 32 U/L    ALT (SGPT) 5 1 - 33 U/L   Lipid Panel   Result Value Ref Range    Total Cholesterol 148 0 - 200 mg/dL    Triglycerides 108 0 - 150 mg/dL    HDL Cholesterol 31 (L) 40 - 60 mg/dL    VLDL Cholesterol 21.6 mg/dL    LDL Cholesterol  95 0 - 100 mg/dL   Vitamin D 25 Hydroxy   Result Value Ref Range    25 Hydroxy, Vitamin D 74.6 30.0 - 100.0 ng/ml   Hemoglobin A1c   Result Value Ref Range    Hemoglobin A1C 7.10 (H) 4.80 - 5.60 %   C-Peptide   Result Value Ref Range    C-Peptide 2.8 1.1 - 4.4 ng/mL   T4, Free   Result Value Ref Range    Free T4 1.28 0.93 - 1.70 ng/dL   T3, Free   Result Value Ref Range    T3, Free 2.8 2.0 - 4.4 pg/mL   Thyroid Panel With TSH   Result Value Ref Range    TSH 2.270 0.450 - 4.500 uIU/mL    T4, Total 6.8 4.5 - 12.0 ug/dL    T3 Uptake 29 24 - 39 %     Free Thyroxine Index 2.0 1.2 - 4.9   Comprehensive Thyroglobulin   Result Value Ref Range    Thyroglobulin Ab <1.0 IU/mL    Thyroglobulin 9.0 ng/mL    Thyroglobulin (TG-RANJEET) Comment ng/mL   Calcium, Ionized   Result Value Ref Range    Ionized Calcium 5.7 (H) 4.5 - 5.6 mg/dL   Phosphorus   Result Value Ref Range    Phosphorus 4.1 2.5 - 4.5 mg/dL   Uric Acid   Result Value Ref Range    Uric Acid 6.6 (H) 2.4 - 5.7 mg/dL   PTH, Intact   Result Value Ref Range    PTH, Intact 43 15 - 65 pg/mL   MicroAlbumin, Urine, Random -   Result Value Ref Range    Microalbumin, Urine 270.6 Not Estab. ug/mL   Cardiovascular Risk Assessment   Result Value Ref Range    Interpretation Note    Diabetes Patient Education   Result Value Ref Range    PDF Image Not applicable          Assessment/Plan   Problems Addressed this Visit        Cardiovascular and Mediastinum    Benign essential HTN    HLD (hyperlipidemia)    Hypertension       Digestive    Vitamin D deficiency disease       Endocrine    Type 2 diabetes mellitus with complication, without long-term current use of insulin (CMS/[facility]) - Primary    Nontoxic multinodular goiter       Other    History of parathyroid surgery    Hyperuricemia    Hypercalcemia        Patient was seen and examined.  Metabolically and clinically she is stable.  Her A1c has gone up slightly to 7.1.  Her previous A1c was 6.7.  She has been advised to monitor her blood sugars 1-2 times daily.  She was given a new meter at today's visit.  No medications were added or changed.  Her BMI is in normal range.  She has lost weight as a result of lifestyle changes due to her 's death.  She states she is just not eating as much.  She will follow-up with Dr. Kitchen her next visit with labs.

## 2020-02-27 NOTE — PATIENT INSTRUCTIONS
Continue all current medications as prescribedEmpagliflozin oral tablets  What is this medicine?  EMPAGLIFLOZIN (EM pa gli FLOE zin) helps to treat type 2 diabetes. It helps to control blood sugar. This drug may also reduce the risk of heart attack or stroke if you have type 2 diabetes and risk factors for heart disease. Treatment is combined with diet and exercise.  This medicine may be used for other purposes; ask your health care provider or pharmacist if you have questions.  COMMON BRAND NAME(S): JARDIANCE  What should I tell my health care provider before I take this medicine?  They need to know if you have any of these conditions:  -dehydration  -diabetic ketoacidosis  -diet low in salt  -eating less due to illness, surgery, dieting, or any other reason  -having surgery  -high cholesterol  -high levels of potassium in the blood  -history of pancreatitis or pancreas problems  -history of yeast infection of the penis or vagina  -if you often drink alcohol  -infections in the bladder, kidneys, or urinary tract  -kidney disease  -liver disease  -low blood pressure  -on hemodialysis  -problems urinating  -type 1 diabetes  -uncircumcised male  -an unusual or allergic reaction to empagliflozin, other medicines, foods, dyes, or preservatives  -pregnant or trying to get pregnant  -breast-feeding  How should I use this medicine?  Take this medicine by mouth with a glass of water. Follow the directions on the prescription label. Take it in the morning, with or without food. Take your dose at the same time each day. Do not take more often than directed. Do not stop taking except on your doctor's advice.  Talk to your pediatrician regarding the use of this medicine in children. Special care may be needed.  Overdosage: If you think you have taken too much of this medicine contact a poison control center or emergency room at once.  NOTE: This medicine is only for you. Do not share this medicine with others.  What if I miss a  dose?  If you miss a dose, take it as soon as you can. If it is almost time for your next dose, take only that dose. Do not take double or extra doses.  What may interact with this medicine?  Do not take this medicine with any of the following medications:  -gatifloxacin  This medicine may also interact with the following medications:  -alcohol  -certain medicines for blood pressure, heart disease  -diuretics  This list may not describe all possible interactions. Give your health care provider a list of all the medicines, herbs, non-prescription drugs, or dietary supplements you use. Also tell them if you smoke, drink alcohol, or use illegal drugs. Some items may interact with your medicine.  What should I watch for while using this medicine?  Visit your doctor or health care professional for regular checks on your progress.  This medicine can cause a serious condition in which there is too much acid in the blood. If you develop nausea, vomiting, stomach pain, unusual tiredness, or breathing problems, stop taking this medicine and call your doctor right away. If possible, use a ketone dipstick to check for ketones in your urine.  A test called the HbA1C (A1C) will be monitored. This is a simple blood test. It measures your blood sugar control over the last 2 to 3 months. You will receive this test every 3 to 6 months.  Learn how to check your blood sugar. Learn the symptoms of low and high blood sugar and how to manage them.  Always carry a quick-source of sugar with you in case you have symptoms of low blood sugar. Examples include hard sugar candy or glucose tablets. Make sure others know that you can choke if you eat or drink when you develop serious symptoms of low blood sugar, such as seizures or unconsciousness. They must get medical help at once.  Tell your doctor or health care professional if you have high blood sugar. You might need to change the dose of your medicine. If you are sick or exercising more  than usual, you might need to change the dose of your medicine.  Do not skip meals. Ask your doctor or health care professional if you should avoid alcohol. Many nonprescription cough and cold products contain sugar or alcohol. These can affect blood sugar.  Wear a medical ID bracelet or chain, and carry a card that describes your disease and details of your medicine and dosage times.  What side effects may I notice from receiving this medicine?  Side effects that you should report to your doctor or health care professional as soon as possible:  -allergic reactions like skin rash, itching or hives, swelling of the face, lips, or tongue  -breathing problems  -dizziness  -feeling faint or lightheaded, falls  -muscle weakness  -nausea, vomiting, unusual stomach upset or pain  -penile discharge, itching, or pain in men  -signs and symptoms of a genital infection, such as fever; tenderness, redness, or swelling in the genitals or area from the genitals to the back of the rectum  -signs and symptoms of low blood sugar such as feeling anxious, confusion, dizziness, increased hunger, unusually weak or tired, sweating, shakiness, cold, irritable, headache, blurred vision, fast heartbeat, loss of consciousness  -signs and symptoms of a urinary tract infection, such as fever, chills, a burning feeling when urinating, blood in the urine, back pain  -trouble passing urine or change in the amount of urine, including an urgent need to urinate more often, in larger amounts, or at night  -unusual tiredness  -vaginal discharge, itching, or odor in women  Side effects that usually do not require medical attention (report to your doctor or health care professional if they continue or are bothersome):  -mild increase in urination  -thirsty  This list may not describe all possible side effects. Call your doctor for medical advice about side effects. You may report side effects to FDA at 8-284-FDA-7305.  Where should I keep my  medicine?  Keep out of the reach of children.  Store at room temperature between 20 and 25 degrees C (68 and 77 degrees F). Throw away any unused medicine after the expiration date.  NOTE: This sheet is a summary. It may not cover all possible information. If you have questions about this medicine, talk to your doctor, pharmacist, or health care provider.  © 2020 [employer] (2018-08-30 10:25:34)

## 2020-03-04 RX ORDER — LANCETS
EACH MISCELLANEOUS
Qty: 200 EACH | Refills: 1 | Status: SHIPPED | OUTPATIENT
Start: 2020-03-04 | End: 2020-03-04 | Stop reason: SDUPTHER

## 2020-03-04 RX ORDER — BLOOD SUGAR DIAGNOSTIC
STRIP MISCELLANEOUS
Qty: 200 EACH | Refills: 1 | Status: SHIPPED | OUTPATIENT
Start: 2020-03-04 | End: 2020-03-04

## 2020-03-04 RX ORDER — LANCETS
EACH MISCELLANEOUS
Qty: 200 EACH | Refills: 1 | Status: SHIPPED | OUTPATIENT
Start: 2020-03-04

## 2020-03-04 RX ORDER — BLOOD SUGAR DIAGNOSTIC
STRIP MISCELLANEOUS
Qty: 200 EACH | Refills: 1 | Status: SHIPPED | OUTPATIENT
Start: 2020-03-04

## 2020-09-21 RX ORDER — EMPAGLIFLOZIN 25 MG/1
TABLET, FILM COATED ORAL
Qty: 90 TABLET | Refills: 0 | Status: SHIPPED | OUTPATIENT
Start: 2020-09-21 | End: 2021-01-04

## 2020-09-22 RX ORDER — ERGOCALCIFEROL 1.25 MG/1
CAPSULE ORAL
Qty: 13 CAPSULE | Refills: 0 | Status: SHIPPED | OUTPATIENT
Start: 2020-09-22 | End: 2020-12-30

## 2020-09-22 RX ORDER — LINAGLIPTIN AND METFORMIN HYDROCHLORIDE 2.5; 1 MG/1; MG/1
TABLET, FILM COATED, EXTENDED RELEASE ORAL
Qty: 180 TABLET | Refills: 0 | Status: SHIPPED | OUTPATIENT
Start: 2020-09-22 | End: 2021-01-04

## 2020-10-05 RX ORDER — ROSUVASTATIN CALCIUM 40 MG/1
TABLET, COATED ORAL
Qty: 30 TABLET | Refills: 0 | Status: SHIPPED | OUTPATIENT
Start: 2020-10-05 | End: 2020-11-09

## 2020-10-15 ENCOUNTER — AMBULATORY SURGICAL CENTER (AMBULATORY)
Dept: URBAN - METROPOLITAN AREA SURGERY 17 | Facility: SURGERY | Age: 70
End: 2020-10-15
Payer: MEDICARE

## 2020-10-15 ENCOUNTER — OFFICE (AMBULATORY)
Dept: URBAN - METROPOLITAN AREA PATHOLOGY 4 | Facility: PATHOLOGY | Age: 70
End: 2020-10-15
Payer: MEDICARE

## 2020-10-15 VITALS
RESPIRATION RATE: 13 BRPM | HEART RATE: 92 BPM | OXYGEN SATURATION: 99 % | HEART RATE: 76 BPM | SYSTOLIC BLOOD PRESSURE: 109 MMHG | HEART RATE: 79 BPM | HEART RATE: 85 BPM | DIASTOLIC BLOOD PRESSURE: 98 MMHG | WEIGHT: 158 LBS | DIASTOLIC BLOOD PRESSURE: 90 MMHG | OXYGEN SATURATION: 100 % | DIASTOLIC BLOOD PRESSURE: 83 MMHG | DIASTOLIC BLOOD PRESSURE: 79 MMHG | RESPIRATION RATE: 22 BRPM | DIASTOLIC BLOOD PRESSURE: 72 MMHG | RESPIRATION RATE: 30 BRPM | SYSTOLIC BLOOD PRESSURE: 118 MMHG | HEART RATE: 96 BPM | SYSTOLIC BLOOD PRESSURE: 125 MMHG | RESPIRATION RATE: 16 BRPM | OXYGEN SATURATION: 95 % | DIASTOLIC BLOOD PRESSURE: 76 MMHG | DIASTOLIC BLOOD PRESSURE: 73 MMHG | DIASTOLIC BLOOD PRESSURE: 70 MMHG | SYSTOLIC BLOOD PRESSURE: 122 MMHG | HEIGHT: 67 IN | RESPIRATION RATE: 14 BRPM | SYSTOLIC BLOOD PRESSURE: 157 MMHG | HEART RATE: 93 BPM | SYSTOLIC BLOOD PRESSURE: 112 MMHG | OXYGEN SATURATION: 97 % | SYSTOLIC BLOOD PRESSURE: 133 MMHG | RESPIRATION RATE: 23 BRPM | SYSTOLIC BLOOD PRESSURE: 138 MMHG | TEMPERATURE: 97.2 F | SYSTOLIC BLOOD PRESSURE: 120 MMHG | HEART RATE: 87 BPM | SYSTOLIC BLOOD PRESSURE: 114 MMHG | DIASTOLIC BLOOD PRESSURE: 64 MMHG | OXYGEN SATURATION: 96 % | HEART RATE: 80 BPM | DIASTOLIC BLOOD PRESSURE: 74 MMHG | RESPIRATION RATE: 21 BRPM | HEART RATE: 89 BPM | HEART RATE: 83 BPM | DIASTOLIC BLOOD PRESSURE: 67 MMHG | OXYGEN SATURATION: 98 % | DIASTOLIC BLOOD PRESSURE: 68 MMHG | TEMPERATURE: 97 F | DIASTOLIC BLOOD PRESSURE: 63 MMHG | SYSTOLIC BLOOD PRESSURE: 144 MMHG | SYSTOLIC BLOOD PRESSURE: 154 MMHG | SYSTOLIC BLOOD PRESSURE: 139 MMHG | HEART RATE: 86 BPM | SYSTOLIC BLOOD PRESSURE: 132 MMHG | RESPIRATION RATE: 11 BRPM | HEART RATE: 82 BPM | SYSTOLIC BLOOD PRESSURE: 113 MMHG | DIASTOLIC BLOOD PRESSURE: 75 MMHG

## 2020-10-15 DIAGNOSIS — K44.9 DIAPHRAGMATIC HERNIA WITHOUT OBSTRUCTION OR GANGRENE: ICD-10-CM

## 2020-10-15 DIAGNOSIS — D13.2 BENIGN NEOPLASM OF DUODENUM: ICD-10-CM

## 2020-10-15 DIAGNOSIS — D12.4 BENIGN NEOPLASM OF DESCENDING COLON: ICD-10-CM

## 2020-10-15 DIAGNOSIS — D12.5 BENIGN NEOPLASM OF SIGMOID COLON: ICD-10-CM

## 2020-10-15 DIAGNOSIS — K63.5 POLYP OF COLON: ICD-10-CM

## 2020-10-15 DIAGNOSIS — D49.0 NEOPLASM OF UNSPECIFIED BEHAVIOR OF DIGESTIVE SYSTEM: ICD-10-CM

## 2020-10-15 DIAGNOSIS — K31.89 OTHER DISEASES OF STOMACH AND DUODENUM: ICD-10-CM

## 2020-10-15 DIAGNOSIS — Z86.010 PERSONAL HISTORY OF COLONIC POLYPS: ICD-10-CM

## 2020-10-15 LAB
GI HISTOLOGY: A. UNSPECIFIED: (no result)
GI HISTOLOGY: B. UNSPECIFIED: (no result)
GI HISTOLOGY: C. UNSPECIFIED: (no result)
GI HISTOLOGY: D. UNSPECIFIED: (no result)
GI HISTOLOGY: PDF REPORT: (no result)

## 2020-10-15 PROCEDURE — 45380 COLONOSCOPY AND BIOPSY: CPT | Mod: 59,PT | Performed by: INTERNAL MEDICINE

## 2020-10-15 PROCEDURE — 45385 COLONOSCOPY W/LESION REMOVAL: CPT | Mod: PT | Performed by: INTERNAL MEDICINE

## 2020-10-15 PROCEDURE — 45380 COLONOSCOPY AND BIOPSY: CPT | Mod: PT,59 | Performed by: INTERNAL MEDICINE

## 2020-10-15 PROCEDURE — 88305 TISSUE EXAM BY PATHOLOGIST: CPT | Performed by: INTERNAL MEDICINE

## 2020-10-15 PROCEDURE — 43239 EGD BIOPSY SINGLE/MULTIPLE: CPT | Performed by: INTERNAL MEDICINE

## 2020-10-19 ENCOUNTER — LAB (OUTPATIENT)
Dept: ENDOCRINOLOGY | Age: 70
End: 2020-10-19

## 2020-10-19 DIAGNOSIS — E04.2 MULTIPLE THYROID NODULES: ICD-10-CM

## 2020-10-19 DIAGNOSIS — I10 BENIGN ESSENTIAL HTN: Primary | ICD-10-CM

## 2020-10-19 DIAGNOSIS — E55.9 VITAMIN D DEFICIENCY DISEASE: ICD-10-CM

## 2020-10-19 DIAGNOSIS — E78.2 MIXED HYPERLIPIDEMIA: ICD-10-CM

## 2020-10-19 DIAGNOSIS — E11.8 TYPE 2 DIABETES MELLITUS WITH COMPLICATION, WITHOUT LONG-TERM CURRENT USE OF INSULIN (HCC): ICD-10-CM

## 2020-10-20 LAB
25(OH)D3+25(OH)D2 SERPL-MCNC: 71 NG/ML (ref 30–100)
ALBUMIN SERPL-MCNC: 4.4 G/DL (ref 3.5–5.2)
ALBUMIN/GLOB SERPL: 1.6 G/DL
ALP SERPL-CCNC: 69 U/L (ref 39–117)
ALT SERPL-CCNC: 10 U/L (ref 1–33)
AST SERPL-CCNC: 10 U/L (ref 1–32)
BILIRUB SERPL-MCNC: 0.2 MG/DL (ref 0–1.2)
BUN SERPL-MCNC: 18 MG/DL (ref 8–23)
BUN/CREAT SERPL: 18.8 (ref 7–25)
C PEPTIDE SERPL-MCNC: 2.9 NG/ML (ref 1.1–4.4)
CALCIUM SERPL-MCNC: 10.3 MG/DL (ref 8.6–10.5)
CHLORIDE SERPL-SCNC: 103 MMOL/L (ref 98–107)
CHOLEST SERPL-MCNC: 148 MG/DL (ref 0–200)
CO2 SERPL-SCNC: 26.8 MMOL/L (ref 22–29)
CREAT SERPL-MCNC: 0.96 MG/DL (ref 0.57–1)
FT4I SERPL CALC-MCNC: 2.2 (ref 1.2–4.9)
GLOBULIN SER CALC-MCNC: 2.8 GM/DL
GLUCOSE SERPL-MCNC: 120 MG/DL (ref 65–99)
HBA1C MFR BLD: 6.4 % (ref 4.8–5.6)
HDLC SERPL-MCNC: 44 MG/DL (ref 40–60)
INTERPRETATION: NORMAL
LDLC SERPL CALC-MCNC: 90 MG/DL (ref 0–100)
Lab: NORMAL
MICROALBUMIN UR-MCNC: 6.1 UG/ML
POTASSIUM SERPL-SCNC: 4.3 MMOL/L (ref 3.5–5.2)
PROT SERPL-MCNC: 7.2 G/DL (ref 6–8.5)
SODIUM SERPL-SCNC: 139 MMOL/L (ref 136–145)
T3FREE SERPL-MCNC: 2.9 PG/ML (ref 2–4.4)
T3RU NFR SERPL: 31 % (ref 24–39)
T4 FREE SERPL-MCNC: 1.31 NG/DL (ref 0.93–1.7)
T4 SERPL-MCNC: 7.1 UG/DL (ref 4.5–12)
TRIGL SERPL-MCNC: 69 MG/DL (ref 0–150)
TSH SERPL DL<=0.005 MIU/L-ACNC: 2.84 UIU/ML (ref 0.45–4.5)
VLDLC SERPL CALC-MCNC: 14 MG/DL (ref 5–40)

## 2020-11-02 ENCOUNTER — OFFICE VISIT (OUTPATIENT)
Dept: ENDOCRINOLOGY | Age: 70
End: 2020-11-02

## 2020-11-02 VITALS
HEIGHT: 66 IN | WEIGHT: 152.2 LBS | DIASTOLIC BLOOD PRESSURE: 74 MMHG | SYSTOLIC BLOOD PRESSURE: 122 MMHG | RESPIRATION RATE: 16 BRPM | BODY MASS INDEX: 24.46 KG/M2

## 2020-11-02 DIAGNOSIS — E21.0 PRIMARY HYPERPARATHYROIDISM (HCC): ICD-10-CM

## 2020-11-02 DIAGNOSIS — E11.9 TYPE 2 DIABETES MELLITUS WITHOUT COMPLICATION, WITH LONG-TERM CURRENT USE OF INSULIN (HCC): Primary | ICD-10-CM

## 2020-11-02 DIAGNOSIS — Z79.4 TYPE 2 DIABETES MELLITUS WITHOUT COMPLICATION, WITH LONG-TERM CURRENT USE OF INSULIN (HCC): Primary | ICD-10-CM

## 2020-11-02 DIAGNOSIS — E78.00 PURE HYPERCHOLESTEROLEMIA: ICD-10-CM

## 2020-11-02 PROCEDURE — 99214 OFFICE O/P EST MOD 30 MIN: CPT | Performed by: NURSE PRACTITIONER

## 2020-11-02 RX ORDER — TELMISARTAN 80 MG/1
80 TABLET ORAL DAILY
Qty: 90 TABLET | Refills: 1 | Status: SHIPPED | OUTPATIENT
Start: 2020-11-02 | End: 2021-06-04

## 2020-11-02 NOTE — PROGRESS NOTES
"70 y.o. female    Patient Care Team:  Rebecca Lin MD as PCP - General (Family Medicine)    Chief Complaint:  dm2 f/u, hyperlipidemia, s/p parathyroidectomy     Subjective   Patient reports she is losing weight however her numbers are stable since 2020.  We will continue to monitor.  Patient reports she had a parathyroidectomy , is currently on vitamin D 50,000 units weekly and her calcium levels have been stable.  She is currently on Crestor 40 mg for her chronic hyperlipidemia.  HPI    Roger Siegel 70 y.o. presents with Type  2 dm as a F/u patient.      Type 2 dm - Diagnosed in .   Today in clinic pt reports being on Jentadueto XR 2.5-1000 BID, Jardiance 25mg QD  Checks BG - not checking much  Dm retinopathy - denies ,Last eye exam - \"a long time ago\". Will arrange   Dm nephropathy - denies  Dm neuropathy - denies  CAD - denies  CVA - denies  Episodes of hypoglycemia - denies  Pt is physically active. weight has been stable.   Pt tries to follow DM diet for most part.   On ARB    Social History     Substance and Sexual Activity   Alcohol Use Yes    Comment: occassionally      Social History     Tobacco Use   Smoking Status Former Smoker   • Packs/day: 1.00   • Years: 40.00   • Pack years: 40.00   • Types: Cigarettes   • Quit date:    • Years since quittin.8   Smokeless Tobacco Never Used       Reviewed primary care physician's/consulting physician documentation and lab results :     Interval History      The following portions of the patient's history were reviewed and updated as appropriate: allergies, current medications, past family history, past medical history, past social history, past surgical history and problem list.    Past Medical History:   Diagnosis Date   • Arthritis     LEFT HIP   • Asthma    • Bilateral bunions    • Colon polyp    • Diabetes mellitus, type 2 (CMS/HCC)    • GERD (gastroesophageal reflux disease)    • Goiter     NODULAR THYROID   • Heart murmur  "   • History of pneumonia 2017   • History of transfusion    • Hypercalcemia    • Hyperlipidemia    • Hyperparathyroidism (CMS/HCC)    • Hypertension    • Left hip pain    • Parathyroid adenoma    • PONV (postoperative nausea and vomiting)    • Seasonal allergies    • Vitamin D deficiency      Family History   Problem Relation Age of Onset   • Heart disease Mother    • Ovarian cancer Mother    • Heart disease Father    • Colon polyps Father      Social History     Socioeconomic History   • Marital status:      Spouse name: Kenny Siegel   • Number of children: 0   • Years of education: Not on file   • Highest education level: Not on file   Occupational History   • Occupation: RN    Tobacco Use   • Smoking status: Former Smoker     Packs/day: 1.00     Years: 40.00     Pack years: 40.00     Types: Cigarettes     Quit date:      Years since quittin.8   • Smokeless tobacco: Never Used   Substance and Sexual Activity   • Alcohol use: Yes     Comment: occassionally    • Drug use: No   • Sexual activity: Defer     No Known Allergies    Current Outpatient Medications:   •  ACCU-CHEK FASTCLIX LANCETS misc, Use as directed twice daily; dx code E11.9, Disp: 200 each, Rfl: 1  •  ACCU-CHEK GUIDE test strip, Use as instructed to test twice daily Dx code E 11.9, Disp: 200 each, Rfl: 1  •  ADVAIR DISKUS 500-50 MCG/DOSE DISKUS, Inhale 1 puff 2 (Two) Times a Day., Disp: , Rfl:   •  amLODIPine (NORVASC) 5 MG tablet, Take 5 mg by mouth Daily., Disp: , Rfl:   •  Blood Glucose Monitoring Suppl (ACCU-CHEK SONIA PLUS) W/DEVICE kit, , Disp: , Rfl:   •  cetirizine (zyrTEC) 10 MG tablet, Take 10 mg by mouth Daily As Needed., Disp: , Rfl:   •  fluticasone (FLONASE) 50 MCG/ACT nasal spray, 2 sprays into each nostril Daily., Disp: , Rfl:   •  GuaiFENesin ER (MUCINEX MAXIMUM STRENGTH) 1200 MG tablet sustained-release 12 hour, Take 1 tablet by mouth As Needed., Disp: , Rfl:   •  Jardiance 25 MG tablet, Take 1 tablet by mouth once  "daily, Disp: 90 tablet, Rfl: 0  •  Jentadueto XR 2.5-1000 MG tablet sustained-release 24 hour, Take 2 tablets by mouth once daily, Disp: 180 tablet, Rfl: 0  •  QUEtiapine (SEROQUEL) 50 MG tablet, Take 1 tablet by mouth Every Night., Disp: 30 tablet, Rfl: 11  •  rosuvastatin (CRESTOR) 40 MG tablet, Take 1 tablet by mouth once daily, Disp: 30 tablet, Rfl: 0  •  telmisartan (MICARDIS) 80 MG tablet, Take 1 tablet by mouth Daily., Disp: 90 tablet, Rfl: 1  •  VENTOLIN  (90 BASE) MCG/ACT inhaler, Inhale 2 puffs Every 6 (Six) Hours As Needed., Disp: , Rfl:   •  vitamin D (ERGOCALCIFEROL) 1.25 MG (55770 UT) capsule capsule, TAKE 1 CAPSULE BY MOUTH ONCE A WEEK ON  SUNDAYS, Disp: 13 capsule, Rfl: 0        Review of Systems   Constitutional: Negative for activity change and fatigue.   Eyes: Negative for photophobia and visual disturbance.   Cardiovascular: Negative for chest pain and palpitations.   Gastrointestinal: Negative for constipation, diarrhea, nausea and vomiting.   Endocrine: Negative for polydipsia, polyphagia and polyuria.   Genitourinary: Negative for dysuria and urgency.   Musculoskeletal: Negative for arthralgias and myalgias.   Neurological: Negative for dizziness and headaches.   Psychiatric/Behavioral: Negative for confusion and sleep disturbance. The patient is not nervous/anxious.      I have reviewed the ROS as documented by the MA; Fawn Bonilla, OLGA.      Objective       Vitals:    11/02/20 0856   BP: 122/74   Resp: 16   Weight: 69 kg (152 lb 3.2 oz)   Height: 167.6 cm (66\")     Body mass index is 24.57 kg/m².      Physical Exam  Constitutional:       General: She is not in acute distress.  Neck:      Musculoskeletal: Normal range of motion and neck supple.   Cardiovascular:      Rate and Rhythm: Normal rate and regular rhythm.   Pulmonary:      Effort: Pulmonary effort is normal. No respiratory distress.   Musculoskeletal: Normal range of motion.         General: No swelling.   Skin:     " General: Skin is warm and dry.   Neurological:      General: No focal deficit present.      Mental Status: She is alert and oriented to person, place, and time.   Psychiatric:         Mood and Affect: Mood normal.         Behavior: Behavior normal.       Results Review:          Lab on 10/19/2020   Component Date Value Ref Range Status   • T3, Free 10/19/2020 2.9  2.0 - 4.4 pg/mL Final   • Free T4 10/19/2020 1.31  0.93 - 1.70 ng/dL Final    Results may be falsely increased if patient taking Biotin.   • TSH 10/19/2020 2.840  0.450 - 4.500 uIU/mL Final   • T4, Total 10/19/2020 7.1  4.5 - 12.0 ug/dL Final   • T3 Uptake 10/19/2020 31  24 - 39 % Final   • Free Thyroxine Index 10/19/2020 2.2  1.2 - 4.9 Final   • C-Peptide 10/19/2020 2.9  1.1 - 4.4 ng/mL Final    C-Peptide reference interval is for fasting patients.   • Hemoglobin A1C 10/19/2020 6.40* 4.80 - 5.60 % Final    Comment: Hemoglobin A1C Ranges:  Increased Risk for Diabetes  5.7% to 6.4%  Diabetes                     >= 6.5%  Diabetic Goal                < 7.0%     • 25 Hydroxy, Vitamin D 10/19/2020 71.0  30.0 - 100.0 ng/ml Final    Comment: Results may be falsely increased if patient taking Biotin.  Reference Range for Total Vitamin D 25(OH)  Deficiency <20.0 ng/mL  Insufficiency 21-29 ng/mL  Sufficiency  ng/mL  Toxicity >100 ng/ml     • Microalbumin, Urine 10/19/2020 6.1  Not Estab. ug/mL Final   • Total Cholesterol 10/19/2020 148  0 - 200 mg/dL Final   • Triglycerides 10/19/2020 69  0 - 150 mg/dL Final   • HDL Cholesterol 10/19/2020 44  40 - 60 mg/dL Final   • VLDL Cholesterol Ramón 10/19/2020 14  5 - 40 mg/dL Final   • LDL Chol Calc ([facility]) 10/19/2020 90  0 - 100 mg/dL Final   • Glucose 10/19/2020 120* 65 - 99 mg/dL Final   • BUN 10/19/2020 18  8 - 23 mg/dL Final   • Creatinine 10/19/2020 0.96  0.57 - 1.00 mg/dL Final   • eGFR Non African Am 10/19/2020 58* >60 mL/min/1.73 Final   • eGFR African Am 10/19/2020 70  >60 mL/min/1.73 Final   • BUN/Creatinine  Ratio 10/19/2020 18.8  7.0 - 25.0 Final   • Sodium 10/19/2020 139  136 - 145 mmol/L Final   • Potassium 10/19/2020 4.3  3.5 - 5.2 mmol/L Final   • Chloride 10/19/2020 103  98 - 107 mmol/L Final   • Total CO2 10/19/2020 26.8  22.0 - 29.0 mmol/L Final   • Calcium 10/19/2020 10.3  8.6 - 10.5 mg/dL Final   • Total Protein 10/19/2020 7.2  6.0 - 8.5 g/dL Final   • Albumin 10/19/2020 4.40  3.50 - 5.20 g/dL Final   • Globulin 10/19/2020 2.8  gm/dL Final   • A/G Ratio 10/19/2020 1.6  g/dL Final   • Total Bilirubin 10/19/2020 0.2  0.0 - 1.2 mg/dL Final   • Alkaline Phosphatase 10/19/2020 69  39 - 117 U/L Final   • AST (SGOT) 10/19/2020 10  1 - 32 U/L Final   • ALT (SGPT) 10/19/2020 10  1 - 33 U/L Final   • Interpretation 10/19/2020 Note   Final    Supplemental report is available.   • PDF Image 10/19/2020 Not applicable   Final     Lab Results   Component Value Date    HGBA1C 6.40 (H) 10/19/2020    HGBA1C 7.10 (H) 02/13/2020    HGBA1C 6.8 (H) 01/04/2020     Lab Results   Component Value Date    MICROALBUR 6.1 10/19/2020    CREATININE 0.96 10/19/2020     Imaging Results (Most Recent)     None                Assessment and Plan:    Diagnoses and all orders for this visit:    1. Type 2 diabetes mellitus without complication, with long-term current use of insulin (CMS/[facility]) (Primary)  -     Comprehensive Metabolic Panel; Future  -     Hemoglobin A1c; Future  -     telmisartan (MICARDIS) 80 MG tablet; Take 1 tablet by mouth Daily.  Dispense: 90 tablet; Refill: 1    2. Pure hypercholesterolemia  -     Comprehensive Metabolic Panel; Future    3. Primary hyperparathyroidism (CMS/HCC)  -     Vitamin D 25 Hydroxy; Future      Patient is doing very well with her glycemic control.  She is at low risk for hypoglycemia.  We will continue her current medications, she will continue her good diabetic diet and exercise as tolerated and she will follow up with Dr. Castillo in 6 months.  She is coping well from the loss of her  and her  "children and grandchildren have moved in with her so she has a good support system.    Patient is tolerating her statin therapy without myalgias, liver function stable.  Her most recent results are excellent and she will continue good work with diet and exercise.    We will continue to follow her calcium and vitamin D levels.  Currently calcium and vitamin D levels are stable and the patient is not experiencing any fatigue, muscle aches or cramps or urinary dysfunction.    Postop follow-up NOTE From 1/17/17:     66 y.o. female who returns to the office for a postoperative visit after right superior parathyroid resection on 1/19/17     Preoperative indications were hypercalcium     Intraoperative findings were an intrathyroidal parathyroid adenoma     Pathology showed a 547 mg parathyroid, 1.1 cm hypercellular     Today, she looks great.  She has some swelling as expected, that will resolve.  Her calcium was 10.5 earlier on 4 tabs.  i suggested she cut back to 2.  She will stop next week and have labs later in the week at Dr Kitchen.  He will make recommendations on vit D.     i'll see her prn.    Maria C Silverio MD    Reviewed Lab results with the patient.             Fawn Bonilla, APRN  11/02/20    EMR Dragon / transcription disclaimer:     \"Dictated utilizing Dragon dictation\".  "

## 2020-11-09 RX ORDER — ROSUVASTATIN CALCIUM 40 MG/1
TABLET, COATED ORAL
Qty: 30 TABLET | Refills: 0 | Status: SHIPPED | OUTPATIENT
Start: 2020-11-09 | End: 2020-12-16 | Stop reason: SDUPTHER

## 2020-12-16 RX ORDER — ROSUVASTATIN CALCIUM 40 MG/1
40 TABLET, COATED ORAL DAILY
Qty: 90 TABLET | Refills: 1 | Status: SHIPPED | OUTPATIENT
Start: 2020-12-16 | End: 2021-05-03

## 2020-12-21 RX ORDER — QUETIAPINE FUMARATE 50 MG/1
50 TABLET, FILM COATED ORAL NIGHTLY
Qty: 30 TABLET | Refills: 11 | OUTPATIENT
Start: 2020-12-21

## 2020-12-21 NOTE — TELEPHONE ENCOUNTER
Pt called requesting refill of QUETIATINE 50MG  [facility]

## 2020-12-30 RX ORDER — ERGOCALCIFEROL 1.25 MG/1
CAPSULE ORAL
Qty: 13 CAPSULE | Refills: 0 | Status: SHIPPED | OUTPATIENT
Start: 2020-12-30 | End: 2021-01-04

## 2021-01-04 RX ORDER — LINAGLIPTIN AND METFORMIN HYDROCHLORIDE 2.5; 1 MG/1; MG/1
TABLET, FILM COATED, EXTENDED RELEASE ORAL
Qty: 180 TABLET | Refills: 0 | Status: SHIPPED | OUTPATIENT
Start: 2021-01-04 | End: 2021-03-30

## 2021-01-04 RX ORDER — ERGOCALCIFEROL 1.25 MG/1
CAPSULE ORAL
Qty: 13 CAPSULE | Refills: 0 | Status: SHIPPED | OUTPATIENT
Start: 2021-01-04 | End: 2021-03-30

## 2021-01-04 RX ORDER — EMPAGLIFLOZIN 25 MG/1
TABLET, FILM COATED ORAL
Qty: 90 TABLET | Refills: 0 | Status: SHIPPED | OUTPATIENT
Start: 2021-01-04 | End: 2021-03-30

## 2021-01-04 RX ORDER — QUETIAPINE FUMARATE 50 MG/1
TABLET, FILM COATED ORAL
Qty: 30 TABLET | Refills: 0 | OUTPATIENT
Start: 2021-01-04

## 2021-01-06 RX ORDER — QUETIAPINE FUMARATE 50 MG/1
TABLET, FILM COATED ORAL
Qty: 30 TABLET | Refills: 0 | OUTPATIENT
Start: 2021-01-06

## 2021-01-29 RX ORDER — QUETIAPINE FUMARATE 50 MG/1
50 TABLET, FILM COATED ORAL NIGHTLY
Qty: 30 TABLET | Refills: 11 | OUTPATIENT
Start: 2021-01-29

## 2021-02-01 ENCOUNTER — TELEPHONE (OUTPATIENT)
Dept: ENDOCRINOLOGY | Age: 71
End: 2021-02-01

## 2021-02-01 NOTE — TELEPHONE ENCOUNTER
Called and sw pt told her to call her PCP  ----- Message from Cedrick Castillo MD sent at 2/1/2021  1:15 PM EST -----  Regarding: FW: Prescription Question  Contact: [phone]      ----- Message -----  From: Tiffany Emerson MA  Sent: 2/1/2021  12:38 PM EST  To: Cedrick Castillo MD  Subject: FW: Prescription Question                        Please advise   ----- Message -----  From: Roger Siegel  Sent: 2/1/2021  11:00 AM EST  To: [facility]  Subject: Prescription Question                            This message  is in regard to the declined refill for Seroquel. The messages were in my junk folder. I was unaware that the medication had been declined.  I would have expected some type of communication as to how I could obtain the refill. I don't take the medication every night. Only when I have difficulty sleeping. It has been several weeks since I have had a good night sleep. Would appreciate something that would allow me to sleep.  Thank You,  Roger Siegel

## 2021-03-19 ENCOUNTER — BULK ORDERING (OUTPATIENT)
Dept: CASE MANAGEMENT | Facility: OTHER | Age: 71
End: 2021-03-19

## 2021-03-19 DIAGNOSIS — Z23 IMMUNIZATION DUE: ICD-10-CM

## 2021-03-30 RX ORDER — ERGOCALCIFEROL 1.25 MG/1
CAPSULE ORAL
Qty: 13 CAPSULE | Refills: 0 | Status: SHIPPED | OUTPATIENT
Start: 2021-03-30 | End: 2021-06-29

## 2021-03-30 RX ORDER — EMPAGLIFLOZIN 25 MG/1
TABLET, FILM COATED ORAL
Qty: 90 TABLET | Refills: 0 | Status: SHIPPED | OUTPATIENT
Start: 2021-03-30 | End: 2022-01-24

## 2021-03-30 RX ORDER — LINAGLIPTIN AND METFORMIN HYDROCHLORIDE 2.5; 1 MG/1; MG/1
TABLET, FILM COATED, EXTENDED RELEASE ORAL
Qty: 180 TABLET | Refills: 0 | Status: SHIPPED | OUTPATIENT
Start: 2021-03-30 | End: 2021-05-03

## 2021-04-19 ENCOUNTER — LAB (OUTPATIENT)
Dept: ENDOCRINOLOGY | Age: 71
End: 2021-04-19

## 2021-04-19 DIAGNOSIS — E21.0 PRIMARY HYPERPARATHYROIDISM (HCC): ICD-10-CM

## 2021-04-19 DIAGNOSIS — E11.9 TYPE 2 DIABETES MELLITUS WITHOUT COMPLICATION, WITH LONG-TERM CURRENT USE OF INSULIN (HCC): ICD-10-CM

## 2021-04-19 DIAGNOSIS — E78.00 PURE HYPERCHOLESTEROLEMIA: ICD-10-CM

## 2021-04-19 DIAGNOSIS — Z79.4 TYPE 2 DIABETES MELLITUS WITHOUT COMPLICATION, WITH LONG-TERM CURRENT USE OF INSULIN (HCC): ICD-10-CM

## 2021-04-20 LAB
25(OH)D3+25(OH)D2 SERPL-MCNC: 80.9 NG/ML (ref 30–100)
ALBUMIN SERPL-MCNC: 4.6 G/DL (ref 3.5–5.2)
ALBUMIN/GLOB SERPL: 1.5 G/DL
ALP SERPL-CCNC: 66 U/L (ref 39–117)
ALT SERPL-CCNC: 9 U/L (ref 1–33)
AST SERPL-CCNC: 13 U/L (ref 1–32)
BILIRUB SERPL-MCNC: 0.3 MG/DL (ref 0–1.2)
BUN SERPL-MCNC: 20 MG/DL (ref 8–23)
BUN/CREAT SERPL: 25.6 (ref 7–25)
CALCIUM SERPL-MCNC: 10.8 MG/DL (ref 8.6–10.5)
CHLORIDE SERPL-SCNC: 103 MMOL/L (ref 98–107)
CO2 SERPL-SCNC: 29.6 MMOL/L (ref 22–29)
CREAT SERPL-MCNC: 0.78 MG/DL (ref 0.57–1)
GLOBULIN SER CALC-MCNC: 3 GM/DL
GLUCOSE SERPL-MCNC: 118 MG/DL (ref 65–99)
HBA1C MFR BLD: 6.8 % (ref 4.8–5.6)
POTASSIUM SERPL-SCNC: 4.4 MMOL/L (ref 3.5–5.2)
PROT SERPL-MCNC: 7.6 G/DL (ref 6–8.5)
SODIUM SERPL-SCNC: 140 MMOL/L (ref 136–145)

## 2021-05-03 ENCOUNTER — OFFICE VISIT (OUTPATIENT)
Dept: ENDOCRINOLOGY | Age: 71
End: 2021-05-03

## 2021-05-03 VITALS
DIASTOLIC BLOOD PRESSURE: 80 MMHG | BODY MASS INDEX: 25.04 KG/M2 | HEIGHT: 66 IN | HEART RATE: 83 BPM | OXYGEN SATURATION: 97 % | SYSTOLIC BLOOD PRESSURE: 126 MMHG | WEIGHT: 155.8 LBS

## 2021-05-03 DIAGNOSIS — E11.8 TYPE 2 DIABETES MELLITUS WITH COMPLICATION, WITHOUT LONG-TERM CURRENT USE OF INSULIN (HCC): Primary | ICD-10-CM

## 2021-05-03 DIAGNOSIS — E04.2 NONTOXIC MULTINODULAR GOITER: ICD-10-CM

## 2021-05-03 DIAGNOSIS — E21.0 PRIMARY HYPERPARATHYROIDISM (HCC): ICD-10-CM

## 2021-05-03 DIAGNOSIS — E78.2 MIXED HYPERLIPIDEMIA: ICD-10-CM

## 2021-05-03 PROCEDURE — 99214 OFFICE O/P EST MOD 30 MIN: CPT | Performed by: INTERNAL MEDICINE

## 2021-05-03 RX ORDER — ROSUVASTATIN CALCIUM 20 MG/1
20 TABLET, COATED ORAL NIGHTLY
Qty: 90 TABLET | Refills: 3 | Status: SHIPPED | OUTPATIENT
Start: 2021-05-03 | End: 2022-09-01 | Stop reason: SDUPTHER

## 2021-05-03 RX ORDER — DIPHENOXYLATE HYDROCHLORIDE AND ATROPINE SULFATE 2.5; .025 MG/1; MG/1
1 TABLET ORAL DAILY
COMMUNITY
End: 2022-01-24

## 2021-05-03 NOTE — PROGRESS NOTES
"Chief Complaint  Chief Complaint   Patient presents with   • Diabetes     testing bs prn / last dm eye exam over 2 yrs ago    • Hyperlipidemia   • Hypertension   • hypercalcemia       Subjective          History of Present Illness    Roger Siegel 70 y.o. presents with Type 2 dm as a F/u patient.  Pt reports that she has been losing steadily and at this time her weight has been stabilized more or less.     Type 2 dm - Diagnosed in 1990.   Today in clinic pt reports being on jardiance 25 mg po daily.   Checks BG - doesn't check  Sensor - x  Dm retinopathy - no ,Last eye exam - due for exam  Dm nephropathy - no  Dm neuropathy - no,Dm neuropathy meds - n/a  CAD -x  CVA -x  Episodes of hypoglycemia - none  Pt is physically active. weight has been stable.   Pt tries to follow DM diet for most part.   On Ace inb.    HLP - on crestor 40 mg po daily.     Multiple thyroid nodules-s/p biopsy in 2016 with benign pathology.  Reviewed the prior thyroid ultrasound.    Primary hyperparathyroidism-s/p right superior parathyroidectomy.  Noted that calcium levels are mildly elevated in today's visit.    Reviewed primary care physician's/consulting physician documentation and lab results         I have reviewed the patient's allergies, medicines, past medical hx, family hx and social hx in detail.    Objective   Vital Signs:   /80 (BP Location: Right arm, Patient Position: Sitting, Cuff Size: Large Adult)   Pulse 83   Ht 167.6 cm (65.98\")   Wt 70.7 kg (155 lb 12.8 oz)   SpO2 97%   BMI 25.16 kg/m²   Physical Exam   General appearance - no distress  Eyes- anicteric sclera  Ear nose and throat-external ears and nose normal.    Respiratory-normal chest on inspection.  No respiratory distress noted.  Skin-no rashes.  Neuro-alert and oriented x3          Result Review :   The following data was reviewed by: Cedrick Castillo MD on 05/03/2021:  Lab on 04/19/2021   Component Date Value Ref Range Status   • 25 Hydroxy, Vitamin D " 04/19/2021 80.9  30.0 - 100.0 ng/ml Final    Comment: Results may be falsely increased if patient taking Biotin.  Reference Range for Total Vitamin D 25(OH)  Deficiency <20.0 ng/mL  Insufficiency 21-29 ng/mL  Sufficiency  ng/mL  Toxicity >100 ng/ml     • Hemoglobin A1C 04/19/2021 6.80* 4.80 - 5.60 % Final    Comment: Hemoglobin A1C Ranges:  Increased Risk for Diabetes  5.7% to 6.4%  Diabetes                     >= 6.5%  Diabetic Goal                < 7.0%     • Glucose 04/19/2021 118* 65 - 99 mg/dL Final   • BUN 04/19/2021 20  8 - 23 mg/dL Final   • Creatinine 04/19/2021 0.78  0.57 - 1.00 mg/dL Final   • eGFR Non  Am 04/19/2021 73  >60 mL/min/1.73 Final    Comment: GFR Normal >60  Chronic Kidney Disease <60  Kidney Failure <15     • eGFR  Am 04/19/2021 89  >60 mL/min/1.73 Final   • BUN/Creatinine Ratio 04/19/2021 25.6* 7.0 - 25.0 Final   • Sodium 04/19/2021 140  136 - 145 mmol/L Final   • Potassium 04/19/2021 4.4  3.5 - 5.2 mmol/L Final   • Chloride 04/19/2021 103  98 - 107 mmol/L Final   • Total CO2 04/19/2021 29.6* 22.0 - 29.0 mmol/L Final   • Calcium 04/19/2021 10.8* 8.6 - 10.5 mg/dL Final   • Total Protein 04/19/2021 7.6  6.0 - 8.5 g/dL Final   • Albumin 04/19/2021 4.60  3.50 - 5.20 g/dL Final   • Globulin 04/19/2021 3.0  gm/dL Final   • A/G Ratio 04/19/2021 1.5  g/dL Final   • Total Bilirubin 04/19/2021 0.3  0.0 - 1.2 mg/dL Final   • Alkaline Phosphatase 04/19/2021 66  39 - 117 U/L Final   • AST (SGOT) 04/19/2021 13  1 - 32 U/L Final   • ALT (SGPT) 04/19/2021 9  1 - 33 U/L Final     Data reviewed: Dr. Kitchen documentation       Results Review:    I reviewed the patient's new clinical results.     Assessment and Plan    Problem List Items Addressed This Visit        Other    Type 2 diabetes mellitus with complication, without long-term current use of insulin (CMS/[facility]) - Primary    Relevant Orders    Hemoglobin A1c    Calcium    PTH, Intact    TSH    T4, Free    Basic Metabolic Panel     "Hemoglobin A1c    Vitamin D 25 Hydroxy    Phosphorus    PTH, Intact & Calcium    HLD (hyperlipidemia)    Relevant Medications    rosuvastatin (CRESTOR) 20 MG tablet    Other Relevant Orders    Hemoglobin A1c    Calcium    PTH, Intact    TSH    T4, Free    Basic Metabolic Panel    Hemoglobin A1c    Vitamin D 25 Hydroxy    Phosphorus    PTH, Intact & Calcium    Nontoxic multinodular goiter    Relevant Orders    Hemoglobin A1c    Calcium    PTH, Intact    TSH    T4, Free    Basic Metabolic Panel    Hemoglobin A1c    Vitamin D 25 Hydroxy    Phosphorus    PTH, Intact & Calcium    US Thyroid      Other Visit Diagnoses     Primary hyperparathyroidism (CMS/HCC)        Relevant Orders    Hemoglobin A1c    Calcium    PTH, Intact    TSH    T4, Free    Basic Metabolic Panel    Hemoglobin A1c    Vitamin D 25 Hydroxy    Phosphorus    PTH, Intact & Calcium        Type 2 diabetes mellitus-chronic problem  Continue Jardiance 25 mg oral daily  Emphasized the importance of hydration.  If calcium levels continue to trend up might consider changing the SGLT2 inhibitors to DPP 4.    Hyperlipidemia  Decrease the dosage of Crestor to 20 mg oral daily.    Multiple thyroid nodules  Proceed with a thyroid ultrasound.    History of primary hyperparathyroidism  Check parathyroid, calcium levels.    Interpreted the blood work-up/imaging results performed by the primary care/consulting physician -    Refills sent to pharmacy    Follow Up     Patient was given instructions and counseling regarding her condition or for health maintenance advice. Please see specific information pulled into the AVS if appropriate.       Thank you for asking me to see your patient, Roger Siegel in consultation.         Cedrick Castillo MD  05/03/21      EMR Dragon / transcription disclaimer:     \"Dictated utilizing Dragon dictation\".         "

## 2021-05-10 NOTE — TELEPHONE ENCOUNTER
Patient stated that she can not stop urinating with jardiance 25 mg   Do you want her to stop the jardaince and go back on the  janujento xr 2.5 -1000 taking  2 once   Having a lot of butt pain not sure if that was related to the jardaince

## 2021-05-10 NOTE — TELEPHONE ENCOUNTER
Okay to stop the Jardiance.  Would recommend considering Janumet 50-thousand 1 tablet 2 times a day.  This should not make the patient urinate significantly like she did on Jardiance.  Advised the patient to improve the hydration as well it is extremely important to avoid dehydration

## 2021-05-11 RX ORDER — SITAGLIPTIN AND METFORMIN HYDROCHLORIDE 1000; 50 MG/1; MG/1
1 TABLET, FILM COATED ORAL 2 TIMES DAILY WITH MEALS
Qty: 60 TABLET | Refills: 3 | Status: SHIPPED | OUTPATIENT
Start: 2021-05-11 | End: 2021-10-14

## 2021-05-17 ENCOUNTER — HOSPITAL ENCOUNTER (OUTPATIENT)
Dept: ULTRASOUND IMAGING | Facility: HOSPITAL | Age: 71
Discharge: HOME | End: 2021-05-17
Admitting: INTERNAL MEDICINE

## 2021-05-17 DIAGNOSIS — E04.2 NONTOXIC MULTINODULAR GOITER: ICD-10-CM

## 2021-05-17 PROCEDURE — 76536 US EXAM OF HEAD AND NECK: CPT

## 2021-05-20 NOTE — PROGRESS NOTES
Bilateral thyroid nodules again demonstrated, none of these  of sufficient size or grade to warrant fine-needle aspiration.  Conservative sonographic surveillance advised.    No concerning features noted.  Continue to monitor for now.

## 2021-06-04 DIAGNOSIS — E11.9 TYPE 2 DIABETES MELLITUS WITHOUT COMPLICATION, WITH LONG-TERM CURRENT USE OF INSULIN (HCC): ICD-10-CM

## 2021-06-04 DIAGNOSIS — Z79.4 TYPE 2 DIABETES MELLITUS WITHOUT COMPLICATION, WITH LONG-TERM CURRENT USE OF INSULIN (HCC): ICD-10-CM

## 2021-06-07 RX ORDER — TELMISARTAN 80 MG/1
TABLET ORAL
Qty: 90 TABLET | Refills: 1 | Status: SHIPPED | OUTPATIENT
Start: 2021-06-07 | End: 2021-12-20

## 2021-06-29 RX ORDER — ERGOCALCIFEROL 1.25 MG/1
CAPSULE ORAL
Qty: 13 CAPSULE | Refills: 1 | Status: SHIPPED | OUTPATIENT
Start: 2021-06-29 | End: 2021-12-27

## 2021-09-01 RX ORDER — ROSUVASTATIN CALCIUM 40 MG/1
TABLET, COATED ORAL
Qty: 90 TABLET | Refills: 0 | OUTPATIENT
Start: 2021-09-01

## 2021-10-14 RX ORDER — SITAGLIPTIN AND METFORMIN HYDROCHLORIDE 1000; 50 MG/1; MG/1
TABLET, FILM COATED ORAL
Qty: 60 TABLET | Refills: 0 | Status: SHIPPED | OUTPATIENT
Start: 2021-10-14 | End: 2021-11-23

## 2021-11-23 RX ORDER — SITAGLIPTIN AND METFORMIN HYDROCHLORIDE 1000; 50 MG/1; MG/1
TABLET, FILM COATED ORAL
Qty: 60 TABLET | Refills: 0 | Status: SHIPPED | OUTPATIENT
Start: 2021-11-23 | End: 2021-12-27

## 2021-12-17 DIAGNOSIS — Z79.4 TYPE 2 DIABETES MELLITUS WITHOUT COMPLICATION, WITH LONG-TERM CURRENT USE OF INSULIN (HCC): ICD-10-CM

## 2021-12-17 DIAGNOSIS — E11.9 TYPE 2 DIABETES MELLITUS WITHOUT COMPLICATION, WITH LONG-TERM CURRENT USE OF INSULIN (HCC): ICD-10-CM

## 2021-12-20 RX ORDER — TELMISARTAN 80 MG/1
TABLET ORAL
Qty: 90 TABLET | Refills: 0 | Status: SHIPPED | OUTPATIENT
Start: 2021-12-20 | End: 2022-04-08

## 2021-12-27 RX ORDER — ERGOCALCIFEROL 1.25 MG/1
CAPSULE ORAL
Qty: 13 CAPSULE | Refills: 0 | Status: SHIPPED | OUTPATIENT
Start: 2021-12-27 | End: 2022-04-08

## 2021-12-27 RX ORDER — SITAGLIPTIN AND METFORMIN HYDROCHLORIDE 1000; 50 MG/1; MG/1
TABLET, FILM COATED ORAL
Qty: 60 TABLET | Refills: 0 | Status: SHIPPED | OUTPATIENT
Start: 2021-12-27 | End: 2022-01-24

## 2022-01-24 ENCOUNTER — OFFICE VISIT (OUTPATIENT)
Dept: ENDOCRINOLOGY | Age: 72
End: 2022-01-24

## 2022-01-24 VITALS
BODY MASS INDEX: 25.39 KG/M2 | HEIGHT: 66 IN | WEIGHT: 158 LBS | DIASTOLIC BLOOD PRESSURE: 78 MMHG | OXYGEN SATURATION: 98 % | HEART RATE: 91 BPM | SYSTOLIC BLOOD PRESSURE: 136 MMHG

## 2022-01-24 DIAGNOSIS — E78.2 MIXED HYPERLIPIDEMIA: ICD-10-CM

## 2022-01-24 DIAGNOSIS — E11.9 TYPE 2 DIABETES MELLITUS WITHOUT COMPLICATION, WITH LONG-TERM CURRENT USE OF INSULIN: Primary | ICD-10-CM

## 2022-01-24 DIAGNOSIS — Z79.4 TYPE 2 DIABETES MELLITUS WITHOUT COMPLICATION, WITH LONG-TERM CURRENT USE OF INSULIN: Primary | ICD-10-CM

## 2022-01-24 DIAGNOSIS — E21.0 PRIMARY HYPERPARATHYROIDISM: ICD-10-CM

## 2022-01-24 PROCEDURE — 99214 OFFICE O/P EST MOD 30 MIN: CPT | Performed by: INTERNAL MEDICINE

## 2022-01-24 RX ORDER — SITAGLIPTIN AND METFORMIN HYDROCHLORIDE 1000; 50 MG/1; MG/1
TABLET, FILM COATED ORAL
Qty: 60 TABLET | Refills: 0 | Status: SHIPPED | OUTPATIENT
Start: 2022-01-24 | End: 2022-03-07

## 2022-01-24 NOTE — PROGRESS NOTES
"Chief Complaint  Chief Complaint   Patient presents with   • Diabetes   FOLLOW UP/ TYPE 2 DM    Subjective          History of Present Illness    Roger Siegel 71 y.o. presents with Type 2 dm as a F/u patient.     Pt has bone on bone in her hip - and is planning surgery in April 2022.       Type 2 dm - Diagnosed about 10+ years ago.   Today in clinic pt reports being on janumet 50 - 1000 mg po bid.   Checks BG - doesn't check   Sensor - x  Dm retinopathy - x,Last eye exam -   Dm nephropathy -x   Dm neuropathy - x,Dm neuropathy meds -   CAD -x  CVA -x  Episodes of hypoglycemia - none  Pt is physically active. weight has been stable.   Pt tries to follow DM diet for most part.       HLP - on crestor 20 mg po daily. Stopped taking it based on the pcp recommendation - to see if her muscle aches are related to the statin and when she was diagnosed with the bone on bone, she sarted taking the medication back.      Multiple thyroid nodules-s/p biopsy in 2016 with benign pathology.  Reviewed the prior thyroid ultrasound.     Primary hyperparathyroidism-s/p right superior parathyroidectomy.  Noted that calcium levels are mildly elevated in today's visit.    Reviewed primary care physician's/consulting physician documentation and lab results         I have reviewed the patient's allergies, medicines, past medical hx, family hx and social hx in detail.    Objective   Vital Signs:   /78   Pulse 91   Ht 167.6 cm (66\")   Wt 71.7 kg (158 lb)   SpO2 98%   BMI 25.50 kg/m²   Physical Exam   General appearance - no distress  Eyes- anicteric sclera  Ear nose and throat-external ears and nose normal.    Respiratory-normal chest on inspection.  No respiratory distress noted.  Skin-no rashes.  Neuro-alert and oriented x3            Result Review :   The following data was reviewed by: Cedrick Castillo MD on 01/24/2022:  Results Encounter on 10/30/2021   Component Date Value Ref Range Status   • TSH 01/10/2022 3.390  0.450 - " 4.500 uIU/mL Final   • Free T4 01/10/2022 1.14  0.82 - 1.77 ng/dL Final   • Glucose 01/10/2022 125* 65 - 99 mg/dL Final   • BUN 01/10/2022 16  8 - 27 mg/dL Final   • Creatinine 01/10/2022 0.81  0.57 - 1.00 mg/dL Final   • eGFR Non  Am 01/10/2022 74  >59 mL/min/1.73 Final   • eGFR African Am 01/10/2022 85  >59 mL/min/1.73 Final    Comment: **In accordance with recommendations from the NKF-ASN Task force,**    [facility] is in the process of updating its eGFR calculation to the    2021 CKD-EPI creatinine equation that estimates kidney function    without a race variable.     • BUN/Creatinine Ratio 01/10/2022 20  12 - 28 Final   • Sodium 01/10/2022 137  134 - 144 mmol/L Final   • Potassium 01/10/2022 4.0  3.5 - 5.2 mmol/L Final   • Chloride 01/10/2022 101  96 - 106 mmol/L Final   • Total CO2 01/10/2022 24  20 - 29 mmol/L Final   • Calcium 01/10/2022 9.8  8.7 - 10.3 mg/dL Final   • Hemoglobin A1C 01/10/2022 6.2* 4.8 - 5.6 % Final    Comment:          Prediabetes: 5.7 - 6.4           Diabetes: >6.4           Glycemic control for adults with diabetes: <7.0     • 25 Hydroxy, Vitamin D 01/10/2022 82.0  30.0 - 100.0 ng/mL Final    Comment: Vitamin D deficiency has been defined by the [place] and an Endocrine Society practice guideline as a  level of serum 25-OH vitamin D less than 20 ng/mL (1,2).  The Endocrine Society went on to further define vitamin D  insufficiency as a level between 21 and 29 ng/mL (2).  1. IOM ([employer]). 2010. Dietary reference     intakes for calcium and D. [place]: The     National Academies Press.  2. Eliezer MF, Jas NC, Trip COOK, et al.     Evaluation, treatment, and prevention of vitamin D     deficiency: an Endocrine Society clinical practice     guideline. JCEM. 2011 Jul; 96(7):8581-30.     • Phosphorus 01/10/2022 3.5  3.0 - 4.3 mg/dL Final   • PTH, Intact 01/10/2022 39  15 - 65 pg/mL Final   • PTH, Intact 01/10/2022 Comment   Final     Comment: Interpretation                 Intact PTH    Calcium                                  (pg/mL)      (mg/dL)  Normal                          15 - 65     8.6 - 10.2  Primary Hyperparathyroidism         >65          >10.2  Secondary Hyperparathyroidism       >65          <10.2  Non-Parathyroid Hypercalcemia       <65          >10.2  Hypoparathyroidism                  <15          < 8.6  Non-Parathyroid Hypocalcemia    15 - 65          < 8.6       Data reviewed: PCP notes       Results Review:    I reviewed the patient's new clinical results.     Assessment and Plan    Problem List Items Addressed This Visit        Other    HLD (hyperlipidemia)    Relevant Orders    Basic Metabolic Panel    Hemoglobin A1c    Lipid Panel    Vitamin D 25 Hydroxy    Vitamin B12 & Folate    TSH    T4, Free      Other Visit Diagnoses     Type 2 diabetes mellitus without complication, with long-term current use of insulin (HCC)    -  Primary    Relevant Orders    Basic Metabolic Panel    Hemoglobin A1c    Lipid Panel    Vitamin D 25 Hydroxy    Vitamin B12 & Folate    TSH    T4, Free    Primary hyperparathyroidism (HCC)        Relevant Orders    Basic Metabolic Panel    Hemoglobin A1c    Lipid Panel    Vitamin D 25 Hydroxy    Vitamin B12 & Folate    TSH    T4, Free        Type 2 diabetes mellitus-uncontrolled with hyperglycemia  Continue Janumet 50-thousand twice daily.    Hyperlipidemia  Continue Crestor 20 mg oral daily.    Vitamin D deficiency  Continue vitamin D replacement 50,000 units q. Weekly.      Interpreted the blood work-up/imaging results performed by the primary care/consulting physician -    Refills sent to pharmacy    Follow Up     Patient was given instructions and counseling regarding her condition or for health maintenance advice. Please see specific information pulled into the AVS if appropriate.       Thank you for asking me to see your patient, Roger Siegel in consultation.         Cedrick Castillo,  "MD  01/24/22      EMR Dragon / transcription disclaimer:     \"Dictated utilizing Dragon dictation\".         "

## 2022-03-07 RX ORDER — SITAGLIPTIN AND METFORMIN HYDROCHLORIDE 1000; 50 MG/1; MG/1
TABLET, FILM COATED ORAL
Qty: 60 TABLET | Refills: 0 | Status: SHIPPED | OUTPATIENT
Start: 2022-03-07 | End: 2022-04-18

## 2022-04-06 DIAGNOSIS — E11.9 TYPE 2 DIABETES MELLITUS WITHOUT COMPLICATION, WITH LONG-TERM CURRENT USE OF INSULIN: ICD-10-CM

## 2022-04-06 DIAGNOSIS — Z79.4 TYPE 2 DIABETES MELLITUS WITHOUT COMPLICATION, WITH LONG-TERM CURRENT USE OF INSULIN: ICD-10-CM

## 2022-04-08 RX ORDER — TELMISARTAN 80 MG/1
TABLET ORAL
Qty: 90 TABLET | Refills: 0 | Status: SHIPPED | OUTPATIENT
Start: 2022-04-08 | End: 2022-07-07

## 2022-04-08 RX ORDER — ERGOCALCIFEROL 1.25 MG/1
CAPSULE ORAL
Qty: 13 CAPSULE | Refills: 0 | Status: SHIPPED | OUTPATIENT
Start: 2022-04-08 | End: 2022-07-11

## 2022-04-15 ENCOUNTER — TELEPHONE (OUTPATIENT)
Dept: ENDOCRINOLOGY | Age: 72
End: 2022-04-15

## 2022-04-15 NOTE — TELEPHONE ENCOUNTER
PT CALLED TO CHECK ON STATUS OF HER REFILL OF Janumet  MG per tablet [Pharmacy Med Name: Janumet  MG Oral Tablet]    GOING TO   [facility] [address] [place] [address] ([facility] [place] [facility] [address] [facility] [phone] [facility] [phone] FX       THANK YOU

## 2022-04-18 RX ORDER — SITAGLIPTIN AND METFORMIN HYDROCHLORIDE 1000; 50 MG/1; MG/1
TABLET, FILM COATED ORAL
Qty: 180 TABLET | Refills: 1 | Status: SHIPPED | OUTPATIENT
Start: 2022-04-18 | End: 2022-09-01

## 2022-04-20 ENCOUNTER — TELEPHONE (OUTPATIENT)
Dept: ENDOCRINOLOGY | Age: 72
End: 2022-04-20

## 2022-07-05 DIAGNOSIS — Z79.4 TYPE 2 DIABETES MELLITUS WITHOUT COMPLICATION, WITH LONG-TERM CURRENT USE OF INSULIN: ICD-10-CM

## 2022-07-05 DIAGNOSIS — E11.9 TYPE 2 DIABETES MELLITUS WITHOUT COMPLICATION, WITH LONG-TERM CURRENT USE OF INSULIN: ICD-10-CM

## 2022-07-07 RX ORDER — TELMISARTAN 80 MG/1
TABLET ORAL
Qty: 90 TABLET | Refills: 0 | Status: SHIPPED | OUTPATIENT
Start: 2022-07-07 | End: 2022-09-01 | Stop reason: SDUPTHER

## 2022-07-07 NOTE — TELEPHONE ENCOUNTER
Rx Refill Note  Requested Prescriptions     Pending Prescriptions Disp Refills    telmisartan (MICARDIS) 80 MG tablet [Pharmacy Med Name: Telmisartan 80 MG Oral Tablet] 90 tablet 0     Sig: Take 1 tablet by mouth once daily      Last office visit with prescribing clinician: 1/24/2022      Next office visit with prescribing clinician: 1/24/2023            Francine Wallace  07/07/22, 07:14 EDT

## 2022-07-11 RX ORDER — ERGOCALCIFEROL 1.25 MG/1
CAPSULE ORAL
Qty: 13 CAPSULE | Refills: 0 | Status: SHIPPED | OUTPATIENT
Start: 2022-07-11 | End: 2022-10-06

## 2022-07-11 NOTE — TELEPHONE ENCOUNTER
Rx Refill Note  Requested Prescriptions     Pending Prescriptions Disp Refills    vitamin D (ERGOCALCIFEROL) 1.25 MG (60173 UT) capsule capsule [Pharmacy Med Name: VITAMIN D2 (ERGO) 1.25MG  CAP] 13 capsule 0     Sig: TAKE 1 CAPSULE BY MOUTH ONCE A WEEK ON  SUNDAYS      Last office visit with prescribing clinician: 1/24/2022      Next office visit with prescribing clinician: 1/24/2023            Francine Wallace  07/11/22, 13:08 EDT

## 2022-08-09 ENCOUNTER — TELEPHONE (OUTPATIENT)
Dept: ENDOCRINOLOGY | Age: 72
End: 2022-08-09

## 2022-09-01 ENCOUNTER — OFFICE VISIT (OUTPATIENT)
Dept: ENDOCRINOLOGY | Age: 72
End: 2022-09-01

## 2022-09-01 VITALS
HEIGHT: 66 IN | TEMPERATURE: 97.5 F | OXYGEN SATURATION: 99 % | SYSTOLIC BLOOD PRESSURE: 126 MMHG | DIASTOLIC BLOOD PRESSURE: 70 MMHG | HEART RATE: 94 BPM | WEIGHT: 161.8 LBS | BODY MASS INDEX: 26 KG/M2

## 2022-09-01 DIAGNOSIS — E04.2 NONTOXIC MULTINODULAR GOITER: ICD-10-CM

## 2022-09-01 DIAGNOSIS — E11.8 TYPE 2 DIABETES MELLITUS WITH COMPLICATION, WITHOUT LONG-TERM CURRENT USE OF INSULIN: Primary | ICD-10-CM

## 2022-09-01 DIAGNOSIS — E78.2 MIXED HYPERLIPIDEMIA: ICD-10-CM

## 2022-09-01 DIAGNOSIS — E21.0 PRIMARY HYPERPARATHYROIDISM: ICD-10-CM

## 2022-09-01 PROCEDURE — 99214 OFFICE O/P EST MOD 30 MIN: CPT | Performed by: NURSE PRACTITIONER

## 2022-09-01 RX ORDER — MONTELUKAST SODIUM 10 MG/1
10 TABLET ORAL DAILY
COMMUNITY
Start: 2022-08-19

## 2022-09-01 RX ORDER — LINAGLIPTIN AND METFORMIN HYDROCHLORIDE 2.5; 1 MG/1; MG/1
1 TABLET, FILM COATED ORAL 2 TIMES DAILY
COMMUNITY
End: 2022-09-01 | Stop reason: SDUPTHER

## 2022-09-01 RX ORDER — TELMISARTAN 80 MG/1
80 TABLET ORAL DAILY
Qty: 90 TABLET | Refills: 1 | Status: SHIPPED | OUTPATIENT
Start: 2022-09-01

## 2022-09-01 RX ORDER — ROSUVASTATIN CALCIUM 20 MG/1
20 TABLET, COATED ORAL NIGHTLY
Qty: 90 TABLET | Refills: 1 | Status: SHIPPED | OUTPATIENT
Start: 2022-09-01 | End: 2023-09-01

## 2022-09-01 RX ORDER — LINAGLIPTIN AND METFORMIN HYDROCHLORIDE 2.5; 1 MG/1; MG/1
1 TABLET, FILM COATED ORAL 2 TIMES DAILY
Qty: 90 TABLET | Refills: 2 | Status: SHIPPED | OUTPATIENT
Start: 2022-09-01 | End: 2022-11-17

## 2022-09-01 NOTE — PROGRESS NOTES
"Chief Complaint  Diabetes (Type 2)    Subjective        Roger Siegel presents to [facility]  History of Present Illness     Did have covid x 2 and the flu since last visit  Had a round of steroids during that time    Hip replacement in June     Type 2 dm    Diagnosed about 10+ years ago.   Today in clinic pt reports being on Jentadueto 2.5-1000mcg BID   Checks BG - doesn't check   Dm retinopathy - x,Last eye exam - \" I need to have my exam\"  Dm nephropathy -x   Dm neuropathy - x  CAD -x  CVA -x  Episodes of hypoglycemia - denies  Pt is more physically active more  On statin and ARB    Lab Results   Component Value Date    CHLPL 148 08/25/2022    TRIG 61 08/25/2022    HDL 38 (L) 08/25/2022    LDL 98 08/25/2022         Objective   Vital Signs:  /70   Pulse 94   Temp 97.5 °F (36.4 °C)   Ht 167.6 cm (65.98\")   Wt 73.4 kg (161 lb 12.8 oz)   SpO2 99%   BMI 26.13 kg/m²   Estimated body mass index is 26.13 kg/m² as calculated from the following:    Height as of this encounter: 167.6 cm (65.98\").    Weight as of this encounter: 73.4 kg (161 lb 12.8 oz).          Physical Exam  Vitals reviewed.   Constitutional:       General: She is not in acute distress.  HENT:      Head: Normocephalic and atraumatic.   Cardiovascular:      Rate and Rhythm: Normal rate and regular rhythm.   Pulmonary:      Effort: Pulmonary effort is normal. No respiratory distress.   Musculoskeletal:         General: No signs of injury. Normal range of motion.      Cervical back: Normal range of motion and neck supple.   Skin:     General: Skin is warm and dry.   Neurological:      Mental Status: She is alert and oriented to person, place, and time. Mental status is at baseline.   Psychiatric:         Mood and Affect: Mood normal.         Behavior: Behavior normal.         Thought Content: Thought content normal.         Judgment: Judgment normal.        Result Review :  The following data was reviewed by: " OLGA Birch on 09/01/2022:  Common labs    Common Labsle 1/10/22 1/10/22 8/25/22 8/25/22 8/25/22    0835 0835 0854 0854 0854   Glucose 125 (A)  127 (A)     BUN 16  17     Creatinine 0.81  0.78     eGFR Non  Am 74       eGFR African Am 85       Sodium 137  140     Potassium 4.0  4.4     Chloride 101  102     Calcium 9.8  9.3     Total Cholesterol     148   Triglycerides     61   HDL Cholesterol     38 (A)   LDL Cholesterol      98   Hemoglobin A1C  6.2 (A)  7.0 (A)    (A) Abnormal value       Comments are available for some flowsheets but are not being displayed.                     Assessment and Plan   Diagnoses and all orders for this visit:    1. Type 2 diabetes mellitus with complication, without long-term current use of insulin (HCC) (Primary)  -     linaGLIPtin-metFORMIN HCl (Jentadueto) 2.5-1000 MG tablet; Take 1 tablet by mouth 2 (Two) Times a Day.  Dispense: 90 tablet; Refill: 2  -     telmisartan (MICARDIS) 80 MG tablet; Take 1 tablet by mouth Daily.  Dispense: 90 tablet; Refill: 1  -     rosuvastatin (CRESTOR) 20 MG tablet; Take 1 tablet by mouth Every Night.  Dispense: 90 tablet; Refill: 1  -     TSH; Future  -     T4, Free; Future  -     Hemoglobin A1c; Future  -     Comprehensive Metabolic Panel; Future  -     Lipid Panel; Future  -     Microalbumin / Creatinine Urine Ratio - Urine, Clean Catch; Future  -     Vitamin D 25 Hydroxy; Future  -     PTH, Intact; Future    2. Mixed hyperlipidemia  -     TSH; Future  -     T4, Free; Future  -     Hemoglobin A1c; Future  -     Comprehensive Metabolic Panel; Future  -     Lipid Panel; Future  -     Microalbumin / Creatinine Urine Ratio - Urine, Clean Catch; Future  -     Vitamin D 25 Hydroxy; Future  -     PTH, Intact; Future    3. Primary hyperparathyroidism (HCC)  -     TSH; Future  -     T4, Free; Future  -     Hemoglobin A1c; Future  -     Comprehensive Metabolic Panel; Future  -     Lipid Panel; Future  -     Microalbumin / Creatinine Urine  Ratio - Urine, Clean Catch; Future  -     Vitamin D 25 Hydroxy; Future  -     PTH, Intact; Future    4. Nontoxic multinodular goiter  -     TSH; Future  -     T4, Free; Future  -     Hemoglobin A1c; Future  -     Comprehensive Metabolic Panel; Future  -     Lipid Panel; Future  -     Microalbumin / Creatinine Urine Ratio - Urine, Clean Catch; Future  -     Vitamin D 25 Hydroxy; Future  -     PTH, Intact; Future             Follow Up   No follow-ups on file.     A1c slightly above target range, no additional changes made at today's visit, continue Jentadueto  Calcium levels noted as normal  We will consider additional thyroid ultrasound with Dr. Castillo in January  Labs before next visit    Patient was given instructions and counseling regarding her condition or for health maintenance advice. Please see specific information pulled into the AVS if appropriate.     OLGA Birch

## 2022-10-06 RX ORDER — ERGOCALCIFEROL 1.25 MG/1
CAPSULE ORAL
Qty: 13 CAPSULE | Refills: 0 | Status: SHIPPED | OUTPATIENT
Start: 2022-10-06 | End: 2023-01-04

## 2022-11-03 RX ORDER — SITAGLIPTIN AND METFORMIN HYDROCHLORIDE 1000; 50 MG/1; MG/1
TABLET, FILM COATED ORAL
Qty: 180 TABLET | Refills: 0 | OUTPATIENT
Start: 2022-11-03

## 2022-11-14 RX ORDER — SITAGLIPTIN AND METFORMIN HYDROCHLORIDE 1000; 50 MG/1; MG/1
TABLET, FILM COATED ORAL
Qty: 180 TABLET | Refills: 0 | OUTPATIENT
Start: 2022-11-14

## 2022-11-14 NOTE — TELEPHONE ENCOUNTER
Rx Refill Note  Requested Prescriptions     Pending Prescriptions Disp Refills   • Janumet  MG per tablet [Pharmacy Med Name: Janumet  MG Oral Tablet] 180 tablet 0     Sig: TAKE 1 TABLET BY MOUTH TWICE DAILY WITH MEALS      Last office visit with prescribing clinician: 1/24/2022      Next office visit with prescribing clinician: 1/24/2023            Sirisha Vila MA  11/14/22, 10:17 EST

## 2022-11-15 ENCOUNTER — TELEPHONE (OUTPATIENT)
Dept: ENDOCRINOLOGY | Age: 72
End: 2022-11-15

## 2022-11-15 NOTE — TELEPHONE ENCOUNTER
PT CALLED IN FOR A REFILL REQUEST FOR THE BELOW MED:    Janumet  MG per tablet [41725]         GOING TO:    [facility] [address] [place] [address] ([facility]), [place] - [address] [facility] [phone] [facility] [phone] FX

## 2022-11-16 RX ORDER — SITAGLIPTIN AND METFORMIN HYDROCHLORIDE 1000; 50 MG/1; MG/1
TABLET, FILM COATED ORAL
Qty: 180 TABLET | Refills: 0 | OUTPATIENT
Start: 2022-11-16

## 2022-11-16 NOTE — TELEPHONE ENCOUNTER
Patient called needing a refill on her Janument. She is going out of town tomorrow s can we call this in as soon as possible?

## 2022-11-16 NOTE — TELEPHONE ENCOUNTER
Rx Refill Note  Requested Prescriptions     Pending Prescriptions Disp Refills   • Janumet  MG per tablet [Pharmacy Med Name: Janumet  MG Oral Tablet] 180 tablet 0     Sig: TAKE 1 TABLET BY MOUTH TWICE DAILY WITH MEALS      Last office visit with prescribing clinician: 1/24/2022      Next office visit with prescribing clinician: 1/24/2023            Fawn Castro MA  11/16/22, 12:07 EST

## 2022-11-17 RX ORDER — SITAGLIPTIN AND METFORMIN HYDROCHLORIDE 1000; 50 MG/1; MG/1
TABLET, FILM COATED ORAL
Qty: 180 TABLET | Refills: 2 | Status: SHIPPED | OUTPATIENT
Start: 2022-11-17

## 2023-01-04 RX ORDER — ERGOCALCIFEROL 1.25 MG/1
CAPSULE ORAL
Qty: 13 CAPSULE | Refills: 0 | Status: SHIPPED | OUTPATIENT
Start: 2023-01-04 | End: 2023-03-27

## 2023-01-10 DIAGNOSIS — E78.2 MIXED HYPERLIPIDEMIA: ICD-10-CM

## 2023-01-10 DIAGNOSIS — E21.0 PRIMARY HYPERPARATHYROIDISM: ICD-10-CM

## 2023-01-10 DIAGNOSIS — E11.8 TYPE 2 DIABETES MELLITUS WITH COMPLICATION, WITHOUT LONG-TERM CURRENT USE OF INSULIN: ICD-10-CM

## 2023-01-10 DIAGNOSIS — E04.2 NONTOXIC MULTINODULAR GOITER: ICD-10-CM

## 2023-01-11 LAB
25(OH)D3+25(OH)D2 SERPL-MCNC: 75.5 NG/ML (ref 30–100)
ALBUMIN SERPL-MCNC: 4.1 G/DL (ref 3.5–5.2)
ALBUMIN/CREAT UR: 22 MG/G CREAT (ref 0–29)
ALBUMIN/GLOB SERPL: 1.3 G/DL
ALP SERPL-CCNC: 80 U/L (ref 39–117)
ALT SERPL-CCNC: 12 U/L (ref 1–33)
AST SERPL-CCNC: 16 U/L (ref 1–32)
BILIRUB SERPL-MCNC: 0.3 MG/DL (ref 0–1.2)
BUN SERPL-MCNC: 18 MG/DL (ref 8–23)
BUN/CREAT SERPL: 18.4 (ref 7–25)
CALCIUM SERPL-MCNC: 10.2 MG/DL (ref 8.6–10.5)
CHLORIDE SERPL-SCNC: 102 MMOL/L (ref 98–107)
CHOLEST SERPL-MCNC: 147 MG/DL (ref 0–200)
CO2 SERPL-SCNC: 26.3 MMOL/L (ref 22–29)
CREAT SERPL-MCNC: 0.98 MG/DL (ref 0.57–1)
CREAT UR-MCNC: 57.1 MG/DL
EGFRCR SERPLBLD CKD-EPI 2021: 61.8 ML/MIN/1.73
GLOBULIN SER CALC-MCNC: 3.2 GM/DL
GLUCOSE SERPL-MCNC: 121 MG/DL (ref 65–99)
HBA1C MFR BLD: 7 % (ref 4.8–5.6)
HDLC SERPL-MCNC: 37 MG/DL (ref 40–60)
IMP & REVIEW OF LAB RESULTS: NORMAL
LDLC SERPL CALC-MCNC: 93 MG/DL (ref 0–100)
MICROALBUMIN UR-MCNC: 12.7 UG/ML
POTASSIUM SERPL-SCNC: 4.8 MMOL/L (ref 3.5–5.2)
PROT SERPL-MCNC: 7.3 G/DL (ref 6–8.5)
PTH-INTACT SERPL-MCNC: 39 PG/ML (ref 15–65)
SODIUM SERPL-SCNC: 139 MMOL/L (ref 136–145)
T4 FREE SERPL-MCNC: 1.2 NG/DL (ref 0.93–1.7)
TRIGL SERPL-MCNC: 90 MG/DL (ref 0–150)
TSH SERPL DL<=0.005 MIU/L-ACNC: 2.02 UIU/ML (ref 0.27–4.2)
VLDLC SERPL CALC-MCNC: 17 MG/DL (ref 5–40)

## 2023-03-07 ENCOUNTER — PATIENT ROUNDING (BHMG ONLY) (OUTPATIENT)
Dept: ENDOCRINOLOGY | Age: 73
End: 2023-03-07
Payer: MEDICARE

## 2023-03-07 ENCOUNTER — OFFICE VISIT (OUTPATIENT)
Dept: ENDOCRINOLOGY | Age: 73
End: 2023-03-07
Payer: MEDICARE

## 2023-03-07 VITALS
HEART RATE: 85 BPM | HEIGHT: 66 IN | OXYGEN SATURATION: 97 % | TEMPERATURE: 96.9 F | BODY MASS INDEX: 26.39 KG/M2 | WEIGHT: 164.2 LBS | SYSTOLIC BLOOD PRESSURE: 136 MMHG | DIASTOLIC BLOOD PRESSURE: 86 MMHG

## 2023-03-07 DIAGNOSIS — E11.8 TYPE 2 DIABETES MELLITUS WITH COMPLICATION, WITHOUT LONG-TERM CURRENT USE OF INSULIN: ICD-10-CM

## 2023-03-07 DIAGNOSIS — E04.2 NONTOXIC MULTINODULAR GOITER: Primary | ICD-10-CM

## 2023-03-07 PROCEDURE — 3051F HG A1C>EQUAL 7.0%<8.0%: CPT | Performed by: INTERNAL MEDICINE

## 2023-03-07 PROCEDURE — 99214 OFFICE O/P EST MOD 30 MIN: CPT | Performed by: INTERNAL MEDICINE

## 2023-03-07 NOTE — PROGRESS NOTES
"New Patient      Chief Complaint    Chief Complaint   Patient presents with   • Diabetes     Type 2: Pt doesn't have meter, doesn't checks bs regularly, is up to date on eye exam, no hx of retinopathy or neuropathy.    • Nontoxic multinodular goiter     Pt states that energy levels have been good, weight is stable, does have family hx of thyroid disease.         HPI:   Roger Siegel is a 72 y.o. female usually seen in the Endocrinology clinic for consultative follow up and management of diabetes and a multinodular goiter.  She was last seen on September 1, 2022.  She has type 2 diabetes that was diagnosed years ago, possibly more than 25 years ago, currently without any definite complications that she is aware of.  She does not have any evidence of diabetic polyneuropathy.  Her last dilated eye examination was about 3 months ago and was normal without any evidence of diabetic retinopathy.  She does not have any history or symptoms suggestive of coronary heart disease.  Her creatinine on January 10, 2023, was 0.98 mg/dL with an estimated GFR of 61.8.  For the treatment of her diabetes, she is currently on Janumet 50/1,000 mg and she Takes 1 tablet 2 times daily.  She does not do regular self-monitoring of blood glucose. \" I do not like pricking my fingers.\"  With what she is doing, her A1c on January 10, 2023, was 7.0% compared to 7.0% on August 25, 2022 and to 6.2% on January 10, 2022.  She did not have any complaints or concerns about her diabetes today but she realizes that her A1c could be better.    She has a history, a longstanding history of a multinodular goiter that was diagnosed \"in my 20s.\"  Her last thyroid ultrasound on May 17, 2021, showed that the right lobe was 4.9 x 1.4 x 1.8 cm and the left lobe was 5.2 x 1.5 x 1.6 cm.  The isthmus was 3 mm.  On the right she had 2 small and subcentimeter thyroid nodules measuring 6 x 5 x 5 mm and it was said to be stable and a 6 x 3 x 4 mm nodule also said to " be stable and similar in appearance to the previous examination.  On the left she had a 1.4 x 0.7 x 0.7 cm nodule which was previously said to measure 1.0 x 0.6 x 0.8 cm and another 8 x 6 x 5 mm which on the previous exam was said to be 7 x 6 x 4 mm.  So overall these have been stable in size. She in fact had a fine-needle aspiration of the larger left thyroid nodule on 2016 and the cytology was benign.  She does not have any risk factors for thyroid cancer.  She is both clinically and biochemically euthyroid and her most recent thyroid function tests on January 10, 2023, showed a TSH of 2.020 uIU/mL with a free T4 of 1.20 ng/dL compared to 0.986 uIU/mL with a free T4 of 1.30 ng/dL on 2022.  She is doing well and was entirely asymptomatic on today's visit.        Past Medical History:   Diagnosis Date   • Arthritis     LEFT HIP   • Asthma    • Bilateral bunions    • Colon polyp    • COVID 2022   • Diabetes mellitus, type 2 (HCC)    • Flu 2022   • GERD (gastroesophageal reflux disease)    • Goiter     NODULAR THYROID   • Heart murmur    • History of pneumonia 2017   • History of transfusion    • Hypercalcemia    • Hyperlipidemia    • Hyperparathyroidism (HCC)    • Hypertension    • Left hip pain    • Parathyroid adenoma    • PONV (postoperative nausea and vomiting)    • Seasonal allergies    • Thyroid nodule    • Vitamin D deficiency           ROS:  Pertinent to this visit, only as mentioned above.  The rest was negative.    Social History     Socioeconomic History   • Marital status:      Spouse name: Kenny Siegel   • Number of children: 0   Tobacco Use   • Smoking status: Former     Packs/day: 1.00     Years: 40.00     Pack years: 40.00     Types: Cigarettes     Quit date: 2002     Years since quittin.1   • Smokeless tobacco: Never   Substance and Sexual Activity   • Alcohol use: Yes     Comment: special occasions   • Drug use: No   • Sexual  activity: Not Currently     Partners: Male     Birth control/protection: Post-menopausal       Physical Exam:  GENERAL: She looked well.  HEENT: Normal examination.  NECK: Mild enlargement of the thyroid without any readily palpable thyroid nodules.    LUNGS: Clear to auscultation bilaterally  CVS: RRR with a short ejection systolic murmur, grade 2 out of 6  EXTREMITIES: Normal examination.    Assessment:  1.  Moderately well-controlled type 2 diabetes as mentioned above.  2.  Multinodular goiter with stable nodules the patient with both clinically and biochemically euthyroid and without any major risk factors for thyroid cancer.    Diagnoses and all orders for this visit:    1. Nontoxic multinodular goiter (Primary)    2. Type 2 diabetes mellitus with complication, without long-term current use of insulin (HCC)    Recommendations:  1.  We reviewed with Ms. Siegel, her diabetic treatment and medication and talked about her reasonably good glycemic control.  2.  We encouraged her to engage in regular exercise to further improve her glycemic control and she agreed.  3.  For now she will continue with the Janumet 50/1000 two times daily as she is taking.  4.  We did encourage her to at least check her fingerstick blood sugars 1-2 times daily but alternate the times so that towards the end of the week she would have readings covering the most important times of the day.  5.  We reviewed her last thyroid ultrasound and talked about the finding of the small borderline in size and subcentimeter thyroid nodules.  6.  We do recommend to continue with conservative follow-up of these nodules.  7.  She is both clinically and biochemically euthyroid and she does not have any major risk factors for thyroid cancer.  8.  She will come back for follow-up in 12 months but she is aware to contact us before then if she should have any questions or concerns.  9.  We gave her the opportunity to ask questions, which we answered and we  addressed her concerns.

## 2023-03-27 RX ORDER — ERGOCALCIFEROL 1.25 MG/1
CAPSULE ORAL
Qty: 13 CAPSULE | Refills: 0 | Status: SHIPPED | OUTPATIENT
Start: 2023-03-27

## 2023-04-13 DIAGNOSIS — E11.8 TYPE 2 DIABETES MELLITUS WITH COMPLICATION, WITHOUT LONG-TERM CURRENT USE OF INSULIN: ICD-10-CM

## 2023-04-13 RX ORDER — TELMISARTAN 80 MG/1
TABLET ORAL
Qty: 90 TABLET | Refills: 0 | OUTPATIENT
Start: 2023-04-13

## 2023-04-13 NOTE — TELEPHONE ENCOUNTER
Rx Refill Note  Requested Prescriptions     Pending Prescriptions Disp Refills    telmisartan (MICARDIS) 80 MG tablet [Pharmacy Med Name: Telmisartan 80 MG Oral Tablet] 90 tablet 0     Sig: Take 1 tablet by mouth once daily      Last office visit with prescribing clinician: 03/27/23  Last telemedicine visit with prescribing clinician: Visit date not found   Next office visit with prescribing clinician: Visit date not found                         Would you like a call back once the refill request has been completed: [] Yes [] No    If the office needs to give you a call back, can they leave a voicemail: [] Yes [] No    Beth Aragon MA  04/13/23, 09:45 EDT

## 2023-05-23 DIAGNOSIS — E83.52 HYPERCALCEMIA: ICD-10-CM

## 2023-05-23 RX ORDER — BLOOD-GLUCOSE METER
1 EACH MISCELLANEOUS 3 TIMES DAILY
Qty: 1 KIT | Refills: 1 | Status: SHIPPED | OUTPATIENT
Start: 2023-05-23

## 2023-05-23 NOTE — TELEPHONE ENCOUNTER
Rx Refill Note  Requested Prescriptions     Pending Prescriptions Disp Refills    Blood Glucose Monitoring Suppl (Accu-Chek Radha Plus) w/Device kit        Last office visit with prescribing clinician: Visit date not found   Last telemedicine visit with prescribing clinician: Visit date not found   Next office visit with prescribing clinician: 3/7/2024                         Would you like a call back once the refill request has been completed: [] Yes [] No    If the office needs to give you a call back, can they leave a voicemail: [] Yes [] No    Francine Wallace  05/23/23, 14:59 EDT

## 2023-05-23 NOTE — TELEPHONE ENCOUNTER
Caller: Roger Siegel    Relationship: Self    Best call back number: [phone]  CAN CALL ANY TIME.     Requested Prescriptions:   Requested Prescriptions     Pending Prescriptions Disp Refills   • Blood Glucose Monitoring Suppl (Accu-Chek Radha Plus) w/Device kit          Pharmacy where request should be sent:    [facility] [address] [phone]    Last office visit with prescribing clinician: Visit date not found   Last telemedicine visit with prescribing clinician: Visit date not found   Next office visit with prescribing clinician: 3/7/2024     Additional details provided by patient: PATIENT IS COMPLETELY OUT OF HER BLOOD GLUCOSE MONITORS.    Does the patient have less than a 3 day supply:  [x] Yes  [] No    Would you like a call back once the refill request has been completed: [x] Yes [] No    If the office needs to give you a call back, can they leave a voicemail: [x] Yes [] No    Janelle Ewing Rep   05/23/23 09:17 EDT

## 2023-06-19 RX ORDER — ERGOCALCIFEROL 1.25 MG/1
CAPSULE ORAL
Qty: 13 CAPSULE | Refills: 0 | Status: SHIPPED | OUTPATIENT
Start: 2023-06-19

## 2023-06-19 NOTE — TELEPHONE ENCOUNTER
Rx Refill Note  Requested Prescriptions     Pending Prescriptions Disp Refills    vitamin D (ERGOCALCIFEROL) 1.25 MG (29459 UT) capsule capsule [Pharmacy Med Name: Vitamin D (Ergocalciferol) 1.25 MG (09328 UT) Oral Capsule] 13 capsule 0     Sig: TAKE 1 CAPSULE BY MOUTH ONCE A WEEK ON SUNDAYS      Last office visit with prescribing clinician: 9/1/2022   Last telemedicine visit with prescribing clinician: Visit date not found   Next office visit with prescribing clinician: Visit date not found                         Would you like a call back once the refill request has been completed: [] Yes [] No    If the office needs to give you a call back, can they leave a voicemail: [] Yes [] No    Sabiha Fairchild MA  06/19/23, 11:46 EDT

## (undated) DEVICE — MSK AIRWY LARYNG LMA UNIQUE STD PK SZ4

## (undated) DEVICE — CONMED DISPOSABLE BRONCHIAL CYTOLOGY BRUSH, STRAIGHT HANDLE, 3 MM X 120 CM: Brand: CONMED

## (undated) DEVICE — BITEBLOCK OMNI BLOC

## (undated) DEVICE — VITAL SIGNS™ JACKSON-REES CIRCUITS: Brand: VITAL SIGNS™

## (undated) DEVICE — LN SMPL O2 NASL/ORL SMART/CAPNOLINE PLS A/

## (undated) DEVICE — SINGLE USE BIOPSY VALVE MAJ-210: Brand: SINGLE USE BIOPSY VALVE (STERILE)

## (undated) DEVICE — ADAPT SWVL FIBROPTIC BRONCH

## (undated) DEVICE — CANNULA,SOFT TOUCH,INFANT,7' TUBING,SC: Brand: MEDLINE

## (undated) DEVICE — TRAP,MUCUS SPECIMEN, 80CC: Brand: MEDLINE

## (undated) DEVICE — SINGLE USE SUCTION VALVE MAJ-209: Brand: SINGLE USE SUCTION VALVE (STERILE)

## (undated) DEVICE — TUBING, SUCTION, 1/4" X 10', STRAIGHT: Brand: MEDLINE